# Patient Record
Sex: FEMALE | Race: WHITE | NOT HISPANIC OR LATINO | Employment: FULL TIME | URBAN - METROPOLITAN AREA
[De-identification: names, ages, dates, MRNs, and addresses within clinical notes are randomized per-mention and may not be internally consistent; named-entity substitution may affect disease eponyms.]

---

## 2017-04-05 ENCOUNTER — ALLSCRIPTS OFFICE VISIT (OUTPATIENT)
Dept: OTHER | Facility: OTHER | Age: 27
End: 2017-04-05

## 2017-04-05 DIAGNOSIS — R23.2 FLUSHING: ICD-10-CM

## 2017-11-02 NOTE — PROGRESS NOTES
Assessment  1  History of URI, acute (465 9) (J06 9)    Plan   Rapid StrepA- POC; Source:Throat; Status:Resulted - Requires Verification,Retrospective By Protocol Authorization;   Done: 87MFT7492 12:00AM  Due:32Kdb9544; Last Updated By:Nghia Villaseñor; 1/2/2017 8:44:33 AM;Ordered; [address]: Sore throat; Ordered By:Lalito Barahona;   (1) THROAT CULTURE (CULTURE, UPPER RESPIRATORY); Status:Resulted - Requires Verification;   Done: 17ISY1048 12:00AM  Due:63Lzh4343;Ordered; [address]: URI, acute; Ordered By:Tobias Barahona; Discussion/Summary  Discussion Summary:   Sudafe as directedas directed  Medication Side Effects Reviewed: Possible side effects of new medications were reviewed with the patient/guardian today  Understands and agrees with treatment plan: The treatment plan was reviewed with the patient/guardian  The patient/guardian understands and agrees with the treatment plan   Counseling Documentation With Imm: The patient was counseled regarding diagnostic results,-- instructions for management,-- risk factor reductions,-- prognosis,-- patient and family education,-- impressions,-- risks and benefits of treatment options,-- importance of compliance with treatment  Follow Up Instructions: Follow Up with your Primary Care Provider in 3-4 days  If your symptoms worsen, go to the nearest [place] [address] Emergency Department  Chief Complaint  Chief Complaint Free Text Note Form: seen here 2 days ago with sore throat, strep negative, still has sore throat now congested and ear pain, taking motrin      History of Present Illness  HPI: 27 y/o f c/o sore throat, nasal congestion, ear fullness/clogging x 2 days  Denies any fever/chills, nausea, vomiting, diarrhea, constipation, new rashes, sick contacts  Pt has not tried any OTC medications  Hospital Based Practices Required Assessment:   Pain Assessment   the patient states they have pain  The pain is located in the throat   (on a scale of 0 to 10, the patient rates the pain at 6 )   Abuse And Domestic Violence Screen    Yes, the patient is safe at home  -- The patient states no one is hurting them  Depression And Suicide Screen  No, the patient has not had thoughts of hurting themself  No, the patient has not felt depressed in the past 7 days  Prefered Language is  english  Primary Language is  engl;patience  Readiness To Learn: Receptive  Barriers To Learning: none  Review of Systems  Focused-Female:   Constitutional: No fever, no chills, feels well, no tiredness, no recent weight gain or loss  ENT: no ear ache, no loss of hearing, no nosebleeds or nasal discharge, no sore throat or hoarseness-- and-- as noted in HPI  Cardiovascular: no complaints of slow or fast heart rate, no chest pain, no palpitations, no leg claudication or lower extremity edema  Respiratory: no complaints of shortness of breath, no wheezing, no dyspnea on exertion, no orthopnea or PND  Breasts: no complaints of breast pain, breast lump or nipple discharge  Gastrointestinal: no complaints of abdominal pain, no constipation, no nausea or diarrhea, no vomiting, no bloody stools  Genitourinary: no complaints of dysuria, no incontinence, no pelvic pain, no dysmenorrhea, no vaginal discharge or abnormal vaginal bleeding  Musculoskeletal: no complaints of arthralgia, no myalgia, no joint swelling or stiffness, no limb pain or swelling  Integumentary: no complaints of skin rash or lesion, no itching or dry skin, no skin wounds  Neurological: no complaints of headache, no confusion, no numbness or tingling, no dizziness or fainting  Active Problems   1  Dysmenorrhea (625 3) (N94 6)   2  Fatigue (780 79) (R53 83)   3  Hidradenitis suppurativa (705 83) (L73 2)   4  Menorrhagia (626 2) (N92 0)   5  Obesity, morbid (more than 100 lbs over ideal weight or BMI > 40) (278 01) (E66 01)   6   Vitamin D deficiency (268 9) (E55 9)    Diarrhea (787 91) (R19 7) Sore throat (462) (J02 9)          Past Medical History  1  History of Acute URI (465 9) (J06 9)   2  History of Epidermal inclusion cyst (706 2) (L72 0)   3  History of contact dermatitis (V13 3) (Z87 2)   4  History of hearing loss (V12 49) (Z86 69)   5  History of migraine (V12 49) (Z86 69)   6  History of skin disorder (V13 3) (Z87 2)  Active Problems And Past Medical History Reviewed: The active problems and past medical history were reviewed and updated today  Family History  Mother    1  Family history of Depression   2  Family history of diabetes mellitus (V18 0) (Z83 3)  Father    3  Family history of Diabetes   4  Family history of diabetes mellitus (V18 0) (Z83 3)   5  Family history of Hiatal hernia   6  Family history of Hypertension  Paternal Grandmother    9  Family history of Bowel disease  Paternal Great Grandmother    6  Family history of Colon cancer  Family History Reviewed: The family history was reviewed and updated today  Social History   · Daily caffeine consumption, 2-3 servings a day   · Former smoker (V15 82) (X00 521)   · No alcohol use   · No drug use  Social History Reviewed: The social history was reviewed and updated today  Surgical History  1  History of Adenoidectomy   2  History of Oral Surgery Tooth Extraction  Surgical History Reviewed: The surgical history was reviewed and updated today  Current Meds   1  Lysteda 650 MG Oral Tablet; TAKE 2 TABLET 3 times daily when on menstral cycle; Therapy: 44VUX4762 to Recorded   2  Vitamin D (Ergocalciferol) 89866 UNIT Oral Capsule; TAKE 1 CAPSULE Weekly; Therapy: 58MZW6222 to (Last Rx:11Nov2016)  Requested for: 99EGQ7853 Ordered  Medication List Reviewed: The medication list was reviewed and updated today  Allergies  1  Biaxin TABS   2  Cipro TABS   3  Sulfa Drugs   4   Zithromax Z-Cordell TABS    Vitals  Signs   Recorded: 12KHD7213 05:41PM   Temperature: 99 F  Heart Rate: 100  Respiration: 20  Systolic: 849  Diastolic: 97  Weight: 622 lb   BMI Calculated: 41 51  BSA Calculated: 2 28  Pain Scale: 6    Physical Exam    Constitutional   General appearance: No acute distress, well appearing and well nourished  Eyes   Conjunctiva and lids: No swelling, erythema or discharge  Ears, Nose, Mouth, and Throat   External inspection of ears and nose: Normal     Otoscopic examination: Tympanic membranes translucent with normal light reflex  Canals patent without erythema  Nasal mucosa, septum, and turbinates: Abnormal   There was a mucoid discharge from both nares  The bilateral nasal mucosa was red  Oropharynx: Abnormal   The posterior pharynx had an exudate-- and-- +PND  Inspection of the oropharynx showed fully visible tonsils, uvula and soft palate (Mallampati class 1)  Pulmonary   Respiratory effort: No increased work of breathing or signs of respiratory distress  Auscultation of lungs: Clear to auscultation  Cardiovascular   Auscultation of heart: Normal rate and rhythm, normal S1 and S2, without murmurs  Lymphatic   Palpation of lymph nodes in neck: No lymphadenopathy  Skin   Skin and subcutaneous tissue: Normal without rashes or lesions      Psychiatric   Orientation to person, place, and time: Normal     Mood and affect: Normal        Signatures   Electronically signed by : Nataliia Bowden [facility]; Nov 1 2017 10:00AM EST                       (Author)    Electronically signed by : SHARON Pedraza ; Nov 1 2017  1:35PM EST                       (Co-author)

## 2017-12-27 ENCOUNTER — APPOINTMENT (EMERGENCY)
Dept: RADIOLOGY | Facility: HOSPITAL | Age: 27
End: 2017-12-27

## 2017-12-27 ENCOUNTER — HOSPITAL ENCOUNTER (EMERGENCY)
Facility: HOSPITAL | Age: 27
Discharge: HOME | End: 2017-12-27
Attending: EMERGENCY MEDICINE | Admitting: EMERGENCY MEDICINE

## 2017-12-27 VITALS
SYSTOLIC BLOOD PRESSURE: 126 MMHG | HEIGHT: 66 IN | TEMPERATURE: 97.6 F | BODY MASS INDEX: 39.37 KG/M2 | OXYGEN SATURATION: 100 % | DIASTOLIC BLOOD PRESSURE: 69 MMHG | RESPIRATION RATE: 16 BRPM | HEART RATE: 80 BPM | WEIGHT: 245 LBS

## 2017-12-27 DIAGNOSIS — R10.11 RUQ ABDOMINAL PAIN: Primary | ICD-10-CM

## 2017-12-27 LAB
ALBUMIN SERPL BCP-MCNC: 3.7 G/DL (ref 3.5–5)
ALP SERPL-CCNC: 82 U/L (ref 46–116)
ALT SERPL W P-5'-P-CCNC: 27 U/L (ref 12–78)
ANION GAP SERPL CALCULATED.3IONS-SCNC: 6 MMOL/L (ref 4–13)
AST SERPL W P-5'-P-CCNC: 14 U/L (ref 5–45)
BASOPHILS # BLD AUTO: 0.03 THOUSANDS/ΜL (ref 0–0.1)
BASOPHILS NFR BLD AUTO: 0 % (ref 0–1)
BILIRUB SERPL-MCNC: 0.45 MG/DL (ref 0.2–1)
BILIRUB UR QL STRIP: NEGATIVE
BUN SERPL-MCNC: 12 MG/DL (ref 5–25)
CALCIUM SERPL-MCNC: 9 MG/DL (ref 8.3–10.1)
CHLORIDE SERPL-SCNC: 106 MMOL/L (ref 100–108)
CLARITY UR: CLEAR
CO2 SERPL-SCNC: 26 MMOL/L (ref 21–32)
COLOR UR: YELLOW
COLOR, POC: NORMAL
CREAT SERPL-MCNC: 0.84 MG/DL (ref 0.6–1.3)
EOSINOPHIL # BLD AUTO: 0.16 THOUSAND/ΜL (ref 0–0.61)
EOSINOPHIL NFR BLD AUTO: 1 % (ref 0–6)
ERYTHROCYTE [DISTWIDTH] IN BLOOD BY AUTOMATED COUNT: 13.3 % (ref 11.6–15.1)
EXT PREG TEST URINE: NORMAL
GFR SERPL CREATININE-BSD FRML MDRD: 95 ML/MIN/1.73SQ M
GLUCOSE SERPL-MCNC: 110 MG/DL (ref 65–140)
GLUCOSE UR STRIP-MCNC: NEGATIVE MG/DL
HCT VFR BLD AUTO: 36.9 % (ref 34.8–46.1)
HGB BLD-MCNC: 12.4 G/DL (ref 11.5–15.4)
HGB UR QL STRIP.AUTO: NEGATIVE
KETONES UR STRIP-MCNC: NEGATIVE MG/DL
LEUKOCYTE ESTERASE UR QL STRIP: NEGATIVE
LIPASE SERPL-CCNC: 133 U/L (ref 73–393)
LYMPHOCYTES # BLD AUTO: 2.86 THOUSANDS/ΜL (ref 0.6–4.47)
LYMPHOCYTES NFR BLD AUTO: 21 % (ref 14–44)
MCH RBC QN AUTO: 27.3 PG (ref 26.8–34.3)
MCHC RBC AUTO-ENTMCNC: 33.6 G/DL (ref 31.4–37.4)
MCV RBC AUTO: 81 FL (ref 82–98)
MONOCYTES # BLD AUTO: 0.85 THOUSAND/ΜL (ref 0.17–1.22)
MONOCYTES NFR BLD AUTO: 6 % (ref 4–12)
NEUTROPHILS # BLD AUTO: 9.74 THOUSANDS/ΜL (ref 1.85–7.62)
NEUTS SEG NFR BLD AUTO: 72 % (ref 43–75)
NITRITE UR QL STRIP: NEGATIVE
NRBC BLD AUTO-RTO: 0 /100 WBCS
PH UR STRIP.AUTO: 5.5 [PH] (ref 4.5–8)
PLATELET # BLD AUTO: 245 THOUSANDS/UL (ref 149–390)
PMV BLD AUTO: 10.1 FL (ref 8.9–12.7)
POTASSIUM SERPL-SCNC: 3.6 MMOL/L (ref 3.5–5.3)
PROT SERPL-MCNC: 8.2 G/DL (ref 6.4–8.2)
PROT UR STRIP-MCNC: NEGATIVE MG/DL
RBC # BLD AUTO: 4.55 MILLION/UL (ref 3.81–5.12)
SODIUM SERPL-SCNC: 138 MMOL/L (ref 136–145)
SP GR UR STRIP.AUTO: 1.02 (ref 1–1.03)
UROBILINOGEN UR QL STRIP.AUTO: 0.2 E.U./DL
WBC # BLD AUTO: 13.69 THOUSAND/UL (ref 4.31–10.16)

## 2017-12-27 PROCEDURE — 96361 HYDRATE IV INFUSION ADD-ON: CPT

## 2017-12-27 PROCEDURE — 85025 COMPLETE CBC W/AUTO DIFF WBC: CPT | Performed by: EMERGENCY MEDICINE

## 2017-12-27 PROCEDURE — 96374 THER/PROPH/DIAG INJ IV PUSH: CPT

## 2017-12-27 PROCEDURE — 83690 ASSAY OF LIPASE: CPT | Performed by: EMERGENCY MEDICINE

## 2017-12-27 PROCEDURE — 81003 URINALYSIS AUTO W/O SCOPE: CPT

## 2017-12-27 PROCEDURE — 80053 COMPREHEN METABOLIC PANEL: CPT | Performed by: EMERGENCY MEDICINE

## 2017-12-27 PROCEDURE — 96375 TX/PRO/DX INJ NEW DRUG ADDON: CPT

## 2017-12-27 PROCEDURE — 36415 COLL VENOUS BLD VENIPUNCTURE: CPT | Performed by: EMERGENCY MEDICINE

## 2017-12-27 PROCEDURE — 81025 URINE PREGNANCY TEST: CPT | Performed by: EMERGENCY MEDICINE

## 2017-12-27 PROCEDURE — 81002 URINALYSIS NONAUTO W/O SCOPE: CPT | Performed by: EMERGENCY MEDICINE

## 2017-12-27 PROCEDURE — 99284 EMERGENCY DEPT VISIT MOD MDM: CPT

## 2017-12-27 PROCEDURE — 76705 ECHO EXAM OF ABDOMEN: CPT

## 2017-12-27 RX ORDER — KETOROLAC TROMETHAMINE 30 MG/ML
15 INJECTION, SOLUTION INTRAMUSCULAR; INTRAVENOUS ONCE
Status: COMPLETED | OUTPATIENT
Start: 2017-12-27 | End: 2017-12-27

## 2017-12-27 RX ORDER — ONDANSETRON 2 MG/ML
4 INJECTION INTRAMUSCULAR; INTRAVENOUS ONCE
Status: COMPLETED | OUTPATIENT
Start: 2017-12-27 | End: 2017-12-27

## 2017-12-27 RX ORDER — DICYCLOMINE HCL 20 MG
20 TABLET ORAL 2 TIMES DAILY
Qty: 20 TABLET | Refills: 0 | Status: SHIPPED | OUTPATIENT
Start: 2017-12-27 | End: 2018-06-21 | Stop reason: ALTCHOICE

## 2017-12-27 RX ORDER — OMEPRAZOLE 20 MG/1
20 CAPSULE, DELAYED RELEASE ORAL DAILY
Qty: 20 CAPSULE | Refills: 0 | Status: SHIPPED | OUTPATIENT
Start: 2017-12-27 | End: 2018-06-21 | Stop reason: ALTCHOICE

## 2017-12-27 RX ADMIN — HYDROMORPHONE HYDROCHLORIDE 0.5 MG: 1 INJECTION, SOLUTION INTRAMUSCULAR; INTRAVENOUS; SUBCUTANEOUS at 06:39

## 2017-12-27 RX ADMIN — KETOROLAC TROMETHAMINE 15 MG: 30 INJECTION, SOLUTION INTRAMUSCULAR at 05:23

## 2017-12-27 RX ADMIN — SODIUM CHLORIDE 1000 ML: 0.9 INJECTION, SOLUTION INTRAVENOUS at 05:19

## 2017-12-27 RX ADMIN — ONDANSETRON 4 MG: 2 INJECTION INTRAMUSCULAR; INTRAVENOUS at 05:23

## 2017-12-27 NOTE — ED ATTENDING ATTESTATION
Alka Diallo MD, saw and evaluated the patient  I have discussed the patient with the resident/non-physician practitioner and agree with the resident's/non-physician practitioner's findings, Plan of Care, and MDM as documented in the resident's/non-physician practitioner's note, except where noted  All available labs and Radiology studies were reviewed  At this point I agree with the current assessment done in the Emergency Department  I have conducted an independent evaluation of this patient including a focused history of:    Emergency Department Note- Isauro Yoder 28 y o  female MRN: [id number]    Unit/Bed#: [facility]B Encounter: [id number]    Isauro Yoder is a 28 y o  female who presents with   Chief Complaint   Patient presents with    Abdominal Pain     Pt  reports RUQ abdominal pain since Sunday  Denies n/v/d/consitpation  History of Present Illness   HPI:  Isauro Yoder is a 28 y o  female who presents for evaluation of:  Acute right upper quadrant abdominal pain since Sunday  The abdominal pain worsened this morning prompting a visit to the ED  The patient denies associated vomiting and diarrhea  She has been nauseous  She denies fevers and chills  She denies hematuria, dysuria, vaginal discharge, and flank pain  Patient's last menstrual period was 12/04/2017 (approximate)  Review of Systems   Constitutional: Positive for appetite change  Negative for fever  HENT: Negative for congestion and sore throat  Cardiovascular: Negative for chest pain and palpitations  Genitourinary: Negative for flank pain and hematuria  All other systems reviewed and are negative  Historical Information   History reviewed  No pertinent past medical history  History reviewed  No pertinent surgical history    Social History   History   Alcohol Use No     History   Drug Use No     History   Smoking Status    Never Smoker   Smokeless Tobacco    Never Used     Family History: non-contributory    Meds/Allergies   all medications and allergies reviewed  Allergies   Allergen Reactions    Biaxin [Clarithromycin] Vomiting    Ciprofloxacin Hives    Sulfa Antibiotics Hives    Zithromax [Azithromycin] Vomiting       Objective   First Vitals:   Blood Pressure: 153/91 (12/27/17 0438)  Pulse: 105 (12/27/17 0438)  Temperature: 97 6 °F (36 4 °C) (12/27/17 0438)  Temp Source: Oral (12/27/17 0438)  Respirations: 18 (12/27/17 0438)  Height: 5' 6" (167 6 cm) (12/27/17 0438)  Weight - Scale: 111 kg (245 lb) (12/27/17 0438)  SpO2: 99 % (12/27/17 0438)    Current Vitals:   Blood Pressure: 126/69 (12/27/17 0813)  Pulse: 80 (12/27/17 0813)  Temperature: 97 6 °F (36 4 °C) (12/27/17 0438)  Temp Source: Oral (12/27/17 0438)  Respirations: 16 (12/27/17 0813)  Height: 5' 6" (167 6 cm) (12/27/17 0438)  Weight - Scale: 111 kg (245 lb) (12/27/17 0438)  SpO2: 100 % (12/27/17 0813)      Intake/Output Summary (Last 24 hours) at 12/27/17 2009  Last data filed at 12/27/17 0701   Gross per 24 hour   Intake             1000 ml   Output                0 ml   Net             1000 ml       Invasive Devices          No matching active lines, drains, or airways          Physical Exam   Constitutional: She is oriented to person, place, and time  She appears well-developed and well-nourished  HENT:   Head: Normocephalic and atraumatic  Eyes: Conjunctivae are normal  Pupils are equal, round, and reactive to light  Cardiovascular: Normal rate and regular rhythm  Pulmonary/Chest: Effort normal and breath sounds normal    Abdominal: Soft  Bowel sounds are normal  There is tenderness (Right upper quadrant)  Musculoskeletal: Normal range of motion  She exhibits no deformity  Neurological: She is alert and oriented to person, place, and time  Skin: Skin is warm and dry  Psychiatric: She has a normal mood and affect   Her behavior is normal  Judgment and thought content normal    Nursing note and vitals reviewed  Medical Decision Makin  Acute right upper quadrant abdominal pain:  Plan to obtain a CBC to rule out leukocytosis; plan to obtain a right upper quadrant ultrasound to rule out cholecystitis; plan to obtain a complete metabolic profile to rule out cholestatic picture and hepatitis      Recent Results (from the past 36 hour(s))   CBC and differential    Collection Time: 17  5:19 AM   Result Value Ref Range    WBC 13 69 (H) 4 31 - 10 16 Thousand/uL    RBC 4 55 3 81 - 5 12 Million/uL    Hemoglobin 12 4 11 5 - 15 4 g/dL    Hematocrit 36 9 34 8 - 46 1 %    MCV 81 (L) 82 - 98 fL    MCH 27 3 26 8 - 34 3 pg    MCHC 33 6 31 4 - 37 4 g/dL    RDW 13 3 11 6 - 15 1 %    MPV 10 1 8 9 - 12 7 fL    Platelets 607 372 - 396 Thousands/uL    nRBC 0 /100 WBCs    Neutrophils Relative 72 43 - 75 %    Lymphocytes Relative 21 14 - 44 %    Monocytes Relative 6 4 - 12 %    Eosinophils Relative 1 0 - 6 %    Basophils Relative 0 0 - 1 %    Neutrophils Absolute 9 74 (H) 1 85 - 7 62 Thousands/µL    Lymphocytes Absolute 2 86 0 60 - 4 47 Thousands/µL    Monocytes Absolute 0 85 0 17 - 1 22 Thousand/µL    Eosinophils Absolute 0 16 0 00 - 0 61 Thousand/µL    Basophils Absolute 0 03 0 00 - 0 10 Thousands/µL   Comprehensive metabolic panel    Collection Time: 17  5:19 AM   Result Value Ref Range    Sodium 138 136 - 145 mmol/L    Potassium 3 6 3 5 - 5 3 mmol/L    Chloride 106 100 - 108 mmol/L    CO2 26 21 - 32 mmol/L    Anion Gap 6 4 - 13 mmol/L    BUN 12 5 - 25 mg/dL    Creatinine 0 84 0 60 - 1 30 mg/dL    Glucose 110 65 - 140 mg/dL    Calcium 9 0 8 3 - 10 1 mg/dL    AST 14 5 - 45 U/L    ALT 27 12 - 78 U/L    Alkaline Phosphatase 82 46 - 116 U/L    Total Protein 8 2 6 4 - 8 2 g/dL    Albumin 3 7 3 5 - 5 0 g/dL    Total Bilirubin 0 45 0 20 - 1 00 mg/dL    eGFR 95 ml/min/1 73sq m   Lipase    Collection Time: 17  5:19 AM   Result Value Ref Range    Lipase 133 73 - 393 u/L   POCT urinalysis dipstick    Collection Time: 12/27/17  5:28 AM   Result Value Ref Range    Color, UA complete    POCT pregnancy, urine    Collection Time: 12/27/17  5:28 AM   Result Value Ref Range    EXT PREG TEST UR (Ref: Negative) neg    ED Urine Macroscopic    Collection Time: 12/27/17  5:28 AM   Result Value Ref Range    Color, UA Yellow     Clarity, UA Clear     pH, UA 5 5 4 5 - 8 0    Leukocytes, UA Negative Negative    Nitrite, UA Negative Negative    Protein, UA Negative Negative mg/dl    Glucose, UA Negative Negative mg/dl    Ketones, UA Negative Negative mg/dl    Urobilinogen, UA 0 2 0 2, 1 0 E U /dl E U /dl    Bilirubin, UA Negative Negative    Blood, UA Negative Negative    Specific Gravity, UA 1 020 1 003 - 1 030     US gallbladder   Final Result      No acute findings         Workstation performed: [id number]               Portions of the record may have been created with voice recognition software  Occasional wrong word or "sound a like" substitutions may have occurred due to the inherent limitations of voice recognition software  Read the chart carefully and recognize, using context, where substitutions have occurred

## 2017-12-27 NOTE — DISCHARGE INSTRUCTIONS
Abdominal Pain   WHAT YOU NEED TO KNOW:   Abdominal pain can be dull, achy, or sharp  You may have pain in one area of your abdomen, or in your entire abdomen  Your pain may be caused by a condition such as constipation, food sensitivity or poisoning, infection, or a blockage  Abdominal pain can also be from a hernia, appendicitis, or an ulcer  Liver, gallbladder, or kidney conditions can also cause abdominal pain  The cause of your abdominal pain may be unknown  DISCHARGE INSTRUCTIONS:   Return to the emergency department if:   · You have new chest pain or shortness of breath  · You have pulsing pain in your upper abdomen or lower back that suddenly becomes constant  · Your pain is in the right lower abdominal area and worsens with movement  · You have a fever over 100 4°F (38°C) or shaking chills  · You are vomiting and cannot keep food or liquids down  · Your pain does not improve or gets worse over the next 8 to 12 hours  · You see blood in your vomit or bowel movements, or they look black and tarry  · Your skin or the whites of your eyes turn yellow  · You are a woman and have a large amount of vaginal bleeding that is not your monthly period  Contact your healthcare provider if:   · You have pain in your lower back  · You are a man and have pain in your testicles  · You have pain when you urinate  · You have questions or concerns about your condition or care  Follow up with your healthcare provider within 24 hours or as directed:  Write down your questions so you remember to ask them during your visits  Medicines:   · Medicines  may be given to calm your stomach and prevent vomiting or to decrease pain  Ask how to take pain medicine safely  · Take your medicine as directed  Contact your healthcare provider if you think your medicine is not helping or if you have side effects  Tell him of her if you are allergic to any medicine   Keep a list of the medicines, vitamins, and herbs you take  Include the amounts, and when and why you take them  Bring the list or the pill bottles to follow-up visits  Carry your medicine list with you in case of an emergency  © 2017 [address] [employer]  Information is for End User's use only and may not be sold, redistributed or otherwise used for commercial purposes  All illustrations and images included in CareNotes® are the copyrighted property of [employer]  or [employer]  The above information is an  only  It is not intended as medical advice for individual conditions or treatments  Talk to your doctor, nurse or pharmacist before following any medical regimen to see if it is safe and effective for you  Abdominal Pain   WHAT YOU NEED TO KNOW:   Abdominal pain can be dull, achy, or sharp  You may have pain in one area of your abdomen, or in your entire abdomen  Your pain may be caused by a condition such as constipation, food sensitivity or poisoning, infection, or a blockage  Abdominal pain can also be from a hernia, appendicitis, or an ulcer  Liver, gallbladder, or kidney conditions can also cause abdominal pain  The cause of your abdominal pain may be unknown  DISCHARGE INSTRUCTIONS:   Return to the emergency department if:   · You have new chest pain or shortness of breath  · You have pulsing pain in your upper abdomen or lower back that suddenly becomes constant  · Your pain is in the right lower abdominal area and worsens with movement  · You have a fever over 100 4°F (38°C) or shaking chills  · You are vomiting and cannot keep food or liquids down  · Your pain does not improve or gets worse over the next 8 to 12 hours  · You see blood in your vomit or bowel movements, or they look black and tarry  · Your skin or the whites of your eyes turn yellow  · You are a woman and have a large amount of vaginal bleeding that is not your monthly period    Contact your healthcare provider if:   · You have pain in your lower back  · You are a man and have pain in your testicles  · You have pain when you urinate  · You have questions or concerns about your condition or care  Follow up with your healthcare provider within 24 hours or as directed:  Write down your questions so you remember to ask them during your visits  Medicines:   · Medicines  may be given to calm your stomach and prevent vomiting or to decrease pain  Ask how to take pain medicine safely  · Take your medicine as directed  Contact your healthcare provider if you think your medicine is not helping or if you have side effects  Tell him of her if you are allergic to any medicine  Keep a list of the medicines, vitamins, and herbs you take  Include the amounts, and when and why you take them  Bring the list or the pill bottles to follow-up visits  Carry your medicine list with you in case of an emergency  © 2017 [address] [employer] Information is for End User's use only and may not be sold, redistributed or otherwise used for commercial purposes  All illustrations and images included in CareNotes® are the copyrighted property of [employer]  or [employer]  The above information is an  only  It is not intended as medical advice for individual conditions or treatments  Talk to your doctor, nurse or pharmacist before following any medical regimen to see if it is safe and effective for you

## 2017-12-27 NOTE — ED PROVIDER NOTES
History  Chief Complaint   Patient presents with    Abdominal Pain     Pt  reports RUQ abdominal pain since Sunday  Denies n/v/d/consitpation  This is a 28 y o  old female who presents to the ED for evaluation of abdominal pain  Patient reports right upper quadrant abdominal pain that started on Sunday  It was constant until Monday evening  Spontaneously remitted  Around 2100 hours last evening it came back  It is sharp, radiates toward her back, rated 8/10  Worse with inspiration  States she can't lay still  Associated with nausea  No vomiting, diarrhea, constipation  She denies fever, chills, night sweats, cough, just, runny nose, chest pain, shortness of breath, urinary symptoms  Last menstrual period was 12/4  There is a family history of gallbladder disease  None     History reviewed  No pertinent past medical history  History reviewed  No pertinent surgical history  History reviewed  No pertinent family history  I have reviewed and agree with the history as documented  Social History   Substance Use Topics    Smoking status: Never Smoker    Smokeless tobacco: Never Used    Alcohol use No      Review of Systems   Constitutional: Negative for chills, fatigue, fever and unexpected weight change  HENT: Negative for congestion, rhinorrhea and sore throat  Eyes: Negative for redness and visual disturbance  Respiratory: Negative for cough and shortness of breath  Cardiovascular: Negative for chest pain and leg swelling  Gastrointestinal: Positive for abdominal pain  Negative for constipation, diarrhea, nausea and vomiting  Endocrine: Negative for cold intolerance and heat intolerance  Genitourinary: Negative for dysuria, frequency and urgency  Musculoskeletal: Negative for back pain  Skin: Negative for rash  Neurological: Negative for dizziness, syncope and numbness  All other systems reviewed and are negative      Physical Exam  ED Triage Vitals [12/27/17 0438]   Temperature Pulse Respirations Blood Pressure SpO2   97 6 °F (36 4 °C) 105 18 153/91 99 %      Temp Source Heart Rate Source Patient Position - Orthostatic VS BP Location FiO2 (%)   Oral Monitor Standing Left arm --      Pain Score       8         Physical Exam   Constitutional: She is oriented to person, place, and time  She appears well-developed and well-nourished  She appears distressed (Mild painful)  HENT:   Head: Normocephalic and atraumatic  Nose: Nose normal    Mouth/Throat: No oropharyngeal exudate  Eyes: Conjunctivae and EOM are normal  Pupils are equal, round, and reactive to light  Neck: Normal range of motion  Neck supple  Cardiovascular: Normal rate, regular rhythm and normal heart sounds  Exam reveals no gallop  No murmur heard  Pulmonary/Chest: Effort normal and breath sounds normal  She has no wheezes  She exhibits no tenderness  Abdominal: Soft  Bowel sounds are normal  She exhibits no distension  There is no tenderness  There is no rebound and no guarding  Musculoskeletal: Normal range of motion  She exhibits no tenderness or deformity  Lymphadenopathy:     She has no cervical adenopathy  Neurological: She is alert and oriented to person, place, and time  No cranial nerve deficit  Skin: Skin is warm and dry  No rash noted  She is not diaphoretic  No erythema  Psychiatric: She has a normal mood and affect  Nursing note and vitals reviewed      ED Medications  Medications   ketorolac (TORADOL) injection 15 mg (15 mg Intravenous Given 12/27/17 0523)   ondansetron (ZOFRAN) injection 4 mg (4 mg Intravenous Given 12/27/17 0523)   sodium chloride 0 9 % bolus 1,000 mL (0 mL Intravenous Stopped 12/27/17 0701)   HYDROmorphone (DILAUDID) 1 mg/mL injection 0 5 mg (0 5 mg Intravenous Given 12/27/17 0639)     Diagnostic Studies  Results Reviewed     Procedure Component Value Units Date/Time    Comprehensive metabolic panel [[id number]] Collected:  12/27/17 2043 Lab Status:  Final result Specimen:  Blood from Arm, Right Updated:  12/27/17 0545     Sodium 138 mmol/L      Potassium 3 6 mmol/L      Chloride 106 mmol/L      CO2 26 mmol/L      Anion Gap 6 mmol/L      BUN 12 mg/dL      Creatinine 0 84 mg/dL      Glucose 110 mg/dL      Calcium 9 0 mg/dL      AST 14 U/L      ALT 27 U/L      Alkaline Phosphatase 82 U/L      Total Protein 8 2 g/dL      Albumin 3 7 g/dL      Total Bilirubin 0 45 mg/dL      eGFR 95 ml/min/1 73sq m     Narrative:         National Kidney Disease Education Program recommendations are as follows:  GFR calculation is accurate only with a steady state creatinine  Chronic Kidney disease less than 60 ml/min/1 73 sq  meters  Kidney failure less than 15 ml/min/1 73 sq  meters      Lipase [[id number]]  (Normal) Collected:  12/27/17 0519    Lab Status:  Final result Specimen:  Blood from Arm, Right Updated:  12/27/17 0545     Lipase 133 u/L     CBC and differential [[id number]]  (Abnormal) Collected:  12/27/17 0519    Lab Status:  Final result Specimen:  Blood from Arm, Right Updated:  12/27/17 0531     WBC 13 69 (H) Thousand/uL      RBC 4 55 Million/uL      Hemoglobin 12 4 g/dL      Hematocrit 36 9 %      MCV 81 (L) fL      MCH 27 3 pg      MCHC 33 6 g/dL      RDW 13 3 %      MPV 10 1 fL      Platelets 481 Thousands/uL      nRBC 0 /100 WBCs      Neutrophils Relative 72 %      Lymphocytes Relative 21 %      Monocytes Relative 6 %      Eosinophils Relative 1 %      Basophils Relative 0 %      Neutrophils Absolute 9 74 (H) Thousands/µL      Lymphocytes Absolute 2 86 Thousands/µL      Monocytes Absolute 0 85 Thousand/µL      Eosinophils Absolute 0 16 Thousand/µL      Basophils Absolute 0 03 Thousands/µL     POCT urinalysis dipstick [[id number]]  (Normal) Resulted:  12/27/17 0528    Lab Status:  Final result Specimen:  Urine Updated:  12/27/17 0528     Color, UA complete    POCT pregnancy, urine [[id number]]  (Normal) Resulted:  12/27/17 0528    Lab Status:  Final result Updated:  12/27/17 0528     EXT PREG TEST UR (Ref: Negative) neg    ED Urine Macroscopic [[id number]]  (Normal) Collected:  12/27/17 0528    Lab Status:  Final result Specimen:  Urine Updated:  12/27/17 0527     Color, UA Yellow     Clarity, UA Clear     pH, UA 5 5     Leukocytes, UA Negative     Nitrite, UA Negative     Protein, UA Negative mg/dl      Glucose, UA Negative mg/dl      Ketones, UA Negative mg/dl      Urobilinogen, UA 0 2 E U /dl      Bilirubin, UA Negative     Blood, UA Negative     Specific Gravity, UA 1 020    Narrative:       CLINITEK RESULT        US gallbladder   Final Result by Robyn Marcelo MD (12/27 1026)      No acute findings         Workstation performed: [id number]           Procedures  Procedures    Phone Consults  ED Phone Contact    ED Course    A/P: This is a 28 y o  female who presents to the ED for evaluation of abdominal pain  Concern for cholecystitis, pancreatitis, hepatitis  We will check abdominal labs, UA, urine pregnancy  Symptom management  Right upper quadrant ultrasound  Dispo accordingly  MDM  CritCare Time    Disposition  Final diagnoses:   RUQ abdominal pain     Time reflects when diagnosis was documented in both MDM as applicable and the Disposition within this note     Time User Action Codes Description Comment    12/27/2017  8:04 AM Berna Jeong Add [R10 11] RUQ abdominal pain       ED Disposition     ED Disposition Condition Comment    Discharge  Lauren Hallman discharge to home/self care      Condition at discharge: Good        Follow-up Information     Follow up With Specialties Details Why [address] [place] [address],  Gastroenterology   [address] [place] [address]  [phone]          Discharge Medication List as of 12/27/2017  8:05 AM      START taking these medications    Details   dicyclomine (BENTYL) 20 mg tablet Take 1 tablet by mouth 2 (two) times a day, Starting Wed 12/27/2017, Print      omeprazole (PriLOSEC) 20 mg delayed release capsule Take 1 capsule by mouth daily, Starting Wed 12/27/2017, Print           No discharge procedures on file  ED Provider  Attending physically available and evaluated Sheela José GLOVER managed the patient along with the ED Attending      Electronically Signed by         Piter Marshall MD  Resident  12/28/17 7427

## 2018-01-13 VITALS
SYSTOLIC BLOOD PRESSURE: 136 MMHG | HEIGHT: 67 IN | TEMPERATURE: 97.5 F | HEART RATE: 84 BPM | DIASTOLIC BLOOD PRESSURE: 84 MMHG | WEIGHT: 264.25 LBS | BODY MASS INDEX: 41.47 KG/M2

## 2018-01-15 NOTE — PROGRESS NOTES
Assessment    1  Encounter for preventive health examination (V70 0) (Z00 00)   2  Fatigue (780 79) (R53 83)   3  Obesity, morbid (more than 100 lbs over ideal weight or BMI > 40) (278 01) (E66 01)   4  Diarrhea (787 91) (R19 7)    Plan  Diarrhea    · (1) CELIAC DISEASE AB PROFILE; Status:Active; Requested for:03Nov2016;    · (1) STOOL CULTURE; Source:Rectum; Status:Active; Requested for:03Nov2016;    · (1) VITAMIN D 25-HYDROXY; Status:Active; Requested for:03Nov2016;   Dysmenorrhea, Health Maintenance    · (1) CBC/PLT/DIFF; Status:Active; Requested for:03Nov2016;   Dysmenorrhea, Menorrhagia, Obesity, morbid (more than 100 lbs over ideal weight or  BMI > 40)    · (1) TSH WITH FT4 REFLEX; Status:Active; Requested NOU:[id number]; Health Maintenance    · (1) COMPREHENSIVE METABOLIC PANEL; Status:Active; Requested for:03Nov2016;   Obesity, morbid (more than 100 lbs over ideal weight or BMI > 40)    · (1) LIPID PANEL, FASTING; Status:Active; Requested for:03Nov2016;     Discussion/Summary  health maintenance visit Currently, she eats an adequate diet and has an inadequate exercise regimen  the risks and benefits of cervical cancer screening were discussed cervical cancer screening is managed by OB/GYN Testing was done today for Declines  Breast cancer screening: breast cancer screening is not indicated  Colorectal cancer screening: colorectal cancer screening is not indicated  Osteoporosis screening: bone mineral density testing is not indicated  Screening lab work includes hemoglobin, glucose, lipid profile, thyroid function testing and 25-hydroxyvitamin D  The Patient will obtain her records of immunizations  Advice and education were given regarding nutrition, weight bearing exercise, weight loss, reproductive health and sunscreen use  Patient discussion: discussed with the patient       As discussed, follow up next week to review results of your labs    Possible side effects of new medications were reviewed with the patient/guardian today  The patient was counseled regarding diagnostic results, instructions for management, risk factor reductions, prognosis, patient and family education, impressions, risks and benefits of treatment options, importance of compliance with treatment  Chief Complaint  Patient here for yearly physical  She reports that she has excessive tiredness      History of Present Illness  HM, Adult Female: The patient is being seen for a health maintenance evaluation  The last health maintenance visit was 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, 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  Social History: Household members include domestic partner of the same sex and mother  She is unmarried  Work status: working full time and occupation:   The patient is a former cigarette smoker, quit smoking 2015 and has never used smokeless tobacco  She has smoked for 15 year(s) and has 2 pack year(s) of cigarette use  She reports rare alcohol use and drinking 5 drinks per month  The patient has no concerns about alcohol abuse  She has never used illicit drugs  General Health: The patient's health since the last visit is described as good  She has regular dental visits  She denies vision problems  She denies hearing loss  Immunizations status: up to date  Lifestyle:  She consumes a diverse and healthy diet  She has weight concerns  She does not exercise regularly  She does not use tobacco  She consumes alcohol  She denies drug use  Reproductive health:  she reports abnormal menses  (Patient reports heavy menstrual bleeding but is on OCP, which helps, she reports  )   she uses no contraception  she is sexually active  she denies prior pregnancies  Screening: cancer screening reviewed and updated  Cervical cancer screening includes a pap smear performed last year, uncertain timing of her last human papilloma virus screening and no previous colposcopy   Breast cancer screening includes no previous mammogram, a clinical breast exam performed last year and monthly breast self-exams performed  She hasn't been previously screened for colorectal cancer  metabolic screening reviewed and updated  Metabolic screening includes no previous lipid profile, no previous glucose screening, no previous thyroid function test and no previous DEXA  risk screening reviewed and updated  Safety elements used: seat belt, safe driving habits and smoke detector, but no motorcycle helmet and no sunscreen  HPI: Patient presents for a physical exam  She reports that she has been very tired lately and has been gaining weight  She does not know when her last physical exam was  She otherwise reports diarrhea after she eats meals and is not sure if she has an allergy or food intolerances  Review of Systems    Constitutional: recent 13 lb weight gain and feeling tired, but no fever, not feeling poorly and no chills  Eyes: No complaints of eye pain, no red eyes, no eyesight problems, no discharge, no dry eyes, no itching of eyes  ENT: no complaints of earache, no loss of hearing, no nose bleeds, no nasal discharge, no sore throat, no hoarseness  Cardiovascular: No complaints of slow heart rate, no fast heart rate, no chest pain, no palpitations, no leg claudication, no lower extremity edema  Respiratory: shortness of breath during exertion, but no shortness of breath, no cough, no orthopnea and no wheezing  Gastrointestinal: no abdominal pain, no nausea, no vomiting, no constipation and no blood in stools    The patient presents with complaints of diarrhea (Has frequent diarrhea  Reports that she gets abdominal cramping and then has to have a BM whenever she eats meals  )  Genitourinary: no dysuria, no pelvic pain, no vaginal discharge, no incontinence and no unexplained vaginal bleeding  Musculoskeletal: No complaints of arthralgias, no myalgias, no joint swelling or stiffness, no limb pain or swelling  Integumentary: No complaints of skin rash or lesions, no itching, no skin wounds, no breast pain or lump  Neurological: No complaints of headache, no confusion, no convulsions, no numbness, no dizziness or fainting, no tingling, no limb weakness, no difficulty walking  Psychiatric: Not suicidal, no sleep disturbance, no anxiety or depression, no change in personality, no emotional problems  Endocrine: no proptosis, no muscle weakness, no hot flashes and no deepening of the voice  no feelings of weakness   Hematologic/Lymphatic: No complaints of swollen glands, no swollen glands in the neck, does not bleed easily, does not bruise easily  Over the past 2 weeks, how often have you been bothered by the following problems? 1 ) Little interest or pleasure in doing things? Not at all    2 ) Feeling down, depressed or hopeless? Not at all    3 ) Trouble falling asleep or sleeping too much? Not at all    4 ) Feeling tired or having little energy? Nearly every day  5 ) Poor appetite or overeating? Several days  6 ) Feeling bad about yourself, or that you are a failure, or have let yourself or your family down? Not at all    7 ) Trouble concentrating on things, such as reading a newspaper or watching television? Nearly every day  8 ) Moving or speaking so slowly that other people could have noticed, or the opposite, moving or speaking faster than usual? Not at all  severity of depression is mild   How difficult have these problems made it for you to do your work, take care of things at home, or get along with people? Not at all  Score 7     ROS reviewed  Active Problems    1  Dysmenorrhea (625 3) (N94 6)   2  Hidradenitis suppurativa (705 83) (L73 2)   3   Menorrhagia (626 2) (N92 0)    Past Medical History    · History of Acute URI (465 9) (J06 9)   · History of Epidermal inclusion cyst (706 2) (L72 0)   · History of contact dermatitis (V13 3) (Z87 2)   · History of hearing loss (V12 49) (Z86 69)   · History of migraine (V12 49) (Z86 69)   · History of skin disorder (V13 3) (Z87 2)    Surgical History    · History of Adenoidectomy   · History of Oral Surgery Tooth Extraction    Family History  Mother    · Family history of Depression   · Family history of diabetes mellitus (V18 0) (Z83 3)  Father    · Family history of Diabetes   · Family history of diabetes mellitus (V18 0) (Z83 3)   · Family history of Hiatal hernia   · Family history of Hypertension  Paternal Grandmother    · Family history of Bowel disease  Paternal Great Grandmother    · Family history of Colon cancer    Social History    · Daily caffeine consumption, 2-3 servings a day   · Former smoker (V15 82) (F98 219)   · No alcohol use   · No drug use    Current Meds   1  Lysteda 650 MG Oral Tablet; TAKE 2 TABLET 3 times daily when on menstral cycle; Therapy: [id number] to Recorded   2  Triamcinolone Acetonide 0 1 % External Cream; APPLY  AND RUB  IN A THIN FILM TO   AFFECTED AREAS TWICE DAILY  (AM AND PM); Therapy: [id number] to (Last Rx:[id number])  Requested for: 19Kby6954 Ordered    Allergies    1  Biaxin TABS   2  Cipro TABS   3  Sulfa Drugs   4  Zithromax Z-Cordell TABS    Vitals   Recorded: 07NAN1931 99:52UW   Systolic 482, LUE, Sitting   Diastolic 80, LUE, Sitting   Heart Rate 95   Temperature 98 1 F, Tympanic   O2 Saturation 99, RA   Height 5 ft 7 in   Weight 263 lb 2 oz   BMI Calculated 41 21   BSA Calculated 2 27     Physical Exam    Constitutional   General appearance: Abnormal   obese  Head and Face   Head and face: Normal     Palpation of the face and sinuses: No sinus tenderness  Eyes   Conjunctiva and lids: No swelling, erythema or discharge  Pupils and irises: Equal, round, reactive to light  Ears, Nose, Mouth, and Throat   External inspection of ears and nose: Normal     Otoscopic examination: Tympanic membranes translucent with normal light reflex  Canals patent without erythema      Hearing: Normal     Nasal mucosa, septum, and turbinates: Normal without edema or erythema  Lips, teeth, and gums: Normal, good dentition  Oropharynx: Normal with no erythema, edema, exudate or lesions  Neck   Neck: Supple, symmetric, trachea midline, no masses  Thyroid: Normal, no thyromegaly  Pulmonary   Respiratory effort: No increased work of breathing or signs of respiratory distress  Auscultation of lungs: Clear to auscultation  Cardiovascular   Auscultation of heart: Normal rate and rhythm, normal S1 and S2, no murmurs  Carotid pulses: 2+ bilaterally  Pedal pulses: 2+ bilaterally  Examination of extremities for edema and/or varicosities: Normal     Abdomen   Abdomen: Non-tender, no masses  Liver and spleen: No hepatomegaly or splenomegaly  Lymphatic   Palpation of lymph nodes in neck: No lymphadenopathy  Musculoskeletal   Gait and station: Normal     Stability: Normal     Skin   Skin and subcutaneous tissue: Normal without rashes or lesions  Psychiatric   Judgment and insight: Normal     Orientation to person, place, and time: Normal     Recent and remote memory: Intact      Mood and affect: Normal        Future Appointments    Date/Time Provider Specialty Site   11/09/2016 02:00 PM Shannon Alarcon, [facility] Internal Medicine [facility]     Signatures   Electronically signed by : Zygmunt Shone; Nov 4 2016  8:43AM EST                       (Author)    Electronically signed by : Pablito Guerrero MD; Nov 4 2016  4:52PM EST                       (Author)

## 2018-01-18 NOTE — MISCELLANEOUS
Message  Return to work or school:   Jens Sanchez is under my professional care  She was seen in my office on 12/28/16        excuse due to illness        Signatures   Electronically signed by : Ana Maria Russo, [facility]; Dec 28 2016 12:59PM EST                       (Author)

## 2018-06-21 ENCOUNTER — OFFICE VISIT (OUTPATIENT)
Dept: INTERNAL MEDICINE CLINIC | Age: 28
End: 2018-06-21
Payer: COMMERCIAL

## 2018-06-21 VITALS
HEIGHT: 66 IN | SYSTOLIC BLOOD PRESSURE: 117 MMHG | WEIGHT: 242.2 LBS | TEMPERATURE: 99.9 F | HEART RATE: 111 BPM | OXYGEN SATURATION: 98 % | BODY MASS INDEX: 38.92 KG/M2 | DIASTOLIC BLOOD PRESSURE: 70 MMHG

## 2018-06-21 DIAGNOSIS — J01.10 ACUTE NON-RECURRENT FRONTAL SINUSITIS: Primary | ICD-10-CM

## 2018-06-21 PROCEDURE — 99213 OFFICE O/P EST LOW 20 MIN: CPT | Performed by: NURSE PRACTITIONER

## 2018-06-21 PROCEDURE — 3008F BODY MASS INDEX DOCD: CPT | Performed by: NURSE PRACTITIONER

## 2018-06-21 PROCEDURE — 1036F TOBACCO NON-USER: CPT | Performed by: NURSE PRACTITIONER

## 2018-06-21 RX ORDER — KETOCONAZOLE 20 MG/G
CREAM TOPICAL 2 TIMES DAILY
COMMUNITY
Start: 2017-04-05 | End: 2018-06-21 | Stop reason: ALTCHOICE

## 2018-06-21 RX ORDER — ERGOCALCIFEROL 1.25 MG/1
1 CAPSULE ORAL WEEKLY
COMMUNITY
Start: 2016-11-11 | End: 2018-06-21 | Stop reason: ALTCHOICE

## 2018-06-21 RX ORDER — AMOXICILLIN AND CLAVULANATE POTASSIUM 875; 125 MG/1; MG/1
1 TABLET, FILM COATED ORAL EVERY 12 HOURS SCHEDULED
Qty: 14 TABLET | Refills: 0 | Status: SHIPPED | OUTPATIENT
Start: 2018-06-21 | End: 2018-06-28

## 2018-06-21 RX ORDER — TRANEXAMIC ACID 650 1/1
TABLET ORAL EVERY 8 HOURS
COMMUNITY
Start: 2016-11-03 | End: 2018-06-21 | Stop reason: ALTCHOICE

## 2018-06-21 NOTE — PROGRESS NOTES
Assessment/Plan:    Will start around Augmentin for sinus infection  Patient with a low-grade temp and slight tachycardia  Patient is instructed to rest   Warm compresses sinuses  Stay well hydrated  She should return for new or worsening symptoms  Patient is to return for follow-up routine evaluation and lab work  Diagnoses and all orders for this visit:    Acute non-recurrent frontal sinusitis  -     amoxicillin-clavulanate (AUGMENTIN) 875-125 mg per tablet; Take 1 tablet by mouth every 12 (twelve) hours for 7 days    Other orders  -     Discontinue: ergocalciferol (VITAMIN D2) 50,000 units; Take 1 capsule by mouth once a week  -     Discontinue: Tranexamic Acid (LYSTEDA) 650 MG TABS; Take by mouth every 8 (eight) hours  -     Discontinue: ketoconazole (NIZORAL) 2 % cream; Apply topically 2 (two) times a day        Subjective:      Patient ID: Julia Mayorga is a 28 y o  female  URI    This is a new problem  The problem has been gradually worsening  Maximum temperature: subjective fever  Associated symptoms include congestion, coughing (productive - green mucous) and sinus pain  Pertinent negatives include no chest pain, ear pain, headaches, nausea, plugged ear sensation, rhinorrhea, sore throat, vomiting or wheezing  Treatments tried: mucinex and sudafed  The treatment provided no relief  Patient does have a past medical history allergies and receives allergy injections at home  Patient is followed by ENT  The following portions of the patient's history were reviewed and updated as appropriate: allergies, current medications, past family history, past medical history, past social history, past surgical history and problem list     Review of Systems   Constitutional: Positive for chills, fatigue and fever  HENT: Positive for congestion, postnasal drip, sinus pain, sinus pressure and voice change  Negative for ear discharge, ear pain, rhinorrhea and sore throat      Respiratory: Positive for cough (productive - green mucous)  Negative for shortness of breath and wheezing  Cardiovascular: Negative for chest pain and palpitations  Gastrointestinal: Negative for nausea and vomiting  Musculoskeletal: Negative for myalgias  Neurological: Negative for dizziness, light-headedness and headaches  Past Medical History:   Diagnosis Date    Contact dermatitis     Last Assessed [id number]  Resolved 20Oct2016   Epidermal inclusion cyst     Last Assessed [id number]  Resolved 20Oct2016   Hearing loss     Migraine     Skin disorder        No current outpatient prescriptions on file  Allergies   Allergen Reactions    Biaxin [Clarithromycin] Vomiting    Ciprofloxacin Hives    Sulfa Antibiotics Hives    Zithromax [Azithromycin] Vomiting       Social History   Past Surgical History:   Procedure Laterality Date    ADENOIDECTOMY      TOOTH EXTRACTION  2008     Family History   Problem Relation Age of Onset    Depression Mother     Diabetes Father     Hiatal hernia Father     Hypertension Father     Other Paternal Grandmother         Bowel disease    Colon cancer Other        Objective:  /70 (BP Location: Left arm, Patient Position: Sitting, Cuff Size: Standard)   Pulse (!) 111   Temp 99 9 °F (37 7 °C) (Tympanic)   Ht 5' 6 02" (1 677 m)   Wt 110 kg (242 lb 3 2 oz)   SpO2 98%   BMI 39 06 kg/m²      Physical Exam   Constitutional: She is oriented to person, place, and time  She appears well-developed and well-nourished  No distress  HENT:   Head: Normocephalic and atraumatic  Right Ear: Hearing, tympanic membrane, external ear and ear canal normal    Left Ear: Hearing, tympanic membrane, external ear and ear canal normal    Nose: Mucosal edema and rhinorrhea present  Right sinus exhibits frontal sinus tenderness  Right sinus exhibits no maxillary sinus tenderness  Left sinus exhibits frontal sinus tenderness  Left sinus exhibits no maxillary sinus tenderness  Mouth/Throat: Uvula is midline and mucous membranes are normal    Cobblestone irritation to posterior pharynx  No distinct erythema or exudate  Eyes: No scleral icterus  Cardiovascular: Normal rate and regular rhythm  Pulmonary/Chest: Effort normal and breath sounds normal  No respiratory distress  She has no wheezes  Neurological: She is alert and oriented to person, place, and time  Skin: Skin is warm and dry  No rash noted  Psychiatric: She has a normal mood and affect  Her behavior is normal  Judgment and thought content normal    Nursing note and vitals reviewed

## 2018-12-26 ENCOUNTER — OFFICE VISIT (OUTPATIENT)
Dept: INTERNAL MEDICINE CLINIC | Facility: CLINIC | Age: 28
End: 2018-12-26
Payer: COMMERCIAL

## 2018-12-26 VITALS
TEMPERATURE: 98.3 F | OXYGEN SATURATION: 99 % | SYSTOLIC BLOOD PRESSURE: 118 MMHG | BODY MASS INDEX: 40.34 KG/M2 | HEART RATE: 102 BPM | WEIGHT: 257 LBS | DIASTOLIC BLOOD PRESSURE: 80 MMHG | HEIGHT: 67 IN

## 2018-12-26 DIAGNOSIS — Z12.4 SCREENING FOR CERVICAL CANCER: ICD-10-CM

## 2018-12-26 DIAGNOSIS — J03.90 TONSILLITIS: Primary | ICD-10-CM

## 2018-12-26 PROCEDURE — 1036F TOBACCO NON-USER: CPT | Performed by: INTERNAL MEDICINE

## 2018-12-26 PROCEDURE — 99213 OFFICE O/P EST LOW 20 MIN: CPT | Performed by: INTERNAL MEDICINE

## 2018-12-26 RX ORDER — AMOXICILLIN AND CLAVULANATE POTASSIUM 875; 125 MG/1; MG/1
1 TABLET, FILM COATED ORAL EVERY 12 HOURS SCHEDULED
Qty: 20 TABLET | Refills: 0 | Status: SHIPPED | OUTPATIENT
Start: 2018-12-26 | End: 2019-01-05

## 2018-12-26 RX ORDER — TRIAMCINOLONE ACETONIDE 1 MG/G
CREAM TOPICAL AS NEEDED
COMMUNITY
End: 2019-05-20

## 2018-12-26 RX ORDER — CLINDAMYCIN PHOSPHATE 10 UG/ML
1 LOTION TOPICAL AS NEEDED
Refills: 3 | COMMUNITY
Start: 2018-10-30

## 2018-12-26 NOTE — PATIENT INSTRUCTIONS
1  Saline spray to be used back of throat and nostrils  2  Augmentin 875 b i d  times 10 days okay to stop after 7 if complete resolution  3  Fluids at least 64 oz a day  4  Probiotics with each antibiotic dosage 5   Note for work for 2 days

## 2018-12-26 NOTE — PROGRESS NOTES
Assessment/Plan:    No problem-specific Assessment & Plan notes found for this encounter  There are no diagnoses linked to this encounter  Subjective:      Patient ID: Clinton Bailon is a 33 y o  female  Sore Throat    This is a recurrent problem  The current episode started in the past 7 days  The problem has been unchanged  There has been no fever  The pain is moderate  Associated symptoms include congestion, drooling, a hoarse voice, stridor, swollen glands and trouble swallowing  She has tried cool liquids for the symptoms  The treatment provided mild relief  The following portions of the patient's history were reviewed and updated as appropriate: past family history, past medical history, past social history, past surgical history and problem list     Review of Systems   Constitutional: Positive for fatigue  HENT: Positive for congestion, drooling, hoarse voice, sore throat and trouble swallowing  Respiratory: Positive for stridor  Allergic/Immunologic: Positive for environmental allergies  Hematological: Positive for adenopathy  All other systems reviewed and are negative  Past Medical History:   Diagnosis Date    Contact dermatitis     Last Assessed [id number]  Resolved [id number]   Epidermal inclusion cyst     Last Assessed [id number]  Resolved [id number]   Hearing loss     Migraine     Skin disorder        No current outpatient prescriptions on file      Allergies   Allergen Reactions    Biaxin [Clarithromycin] Vomiting    Ciprofloxacin Hives    Sulfa Antibiotics Hives    Zithromax [Azithromycin] Vomiting       Social History   Past Surgical History:   Procedure Laterality Date    ADENOIDECTOMY      TOOTH EXTRACTION  2008     Family History   Problem Relation Age of Onset    Depression Mother     Diabetes Father     Hiatal hernia Father     Hypertension Father     Other Paternal Grandmother         Bowel disease    Colon cancer Other Objective: There were no vitals taken for this visit  No results found for this or any previous visit (from the past 1344 hour(s))  Physical Exam   Constitutional: She appears well-developed and well-nourished  She appears distressed  HENT:   Head: Normocephalic and atraumatic  Mouth/Throat: Mucous membranes are normal  Uvula swelling present  Posterior oropharyngeal edema and posterior oropharyngeal erythema present  No oropharyngeal exudate  Eyes: Pupils are equal, round, and reactive to light  Conjunctivae and EOM are normal  No scleral icterus  Neck: Normal range of motion  Neck supple  Cardiovascular: Normal rate and regular rhythm  Pulmonary/Chest: Effort normal  No respiratory distress  Lymphadenopathy:     She has cervical adenopathy

## 2018-12-31 ENCOUNTER — OFFICE VISIT (OUTPATIENT)
Dept: INTERNAL MEDICINE CLINIC | Facility: CLINIC | Age: 28
End: 2018-12-31
Payer: COMMERCIAL

## 2018-12-31 VITALS
BODY MASS INDEX: 40.31 KG/M2 | WEIGHT: 250.8 LBS | HEIGHT: 66 IN | TEMPERATURE: 98.1 F | HEART RATE: 92 BPM | SYSTOLIC BLOOD PRESSURE: 120 MMHG | DIASTOLIC BLOOD PRESSURE: 88 MMHG | OXYGEN SATURATION: 98 %

## 2018-12-31 DIAGNOSIS — J01.10 ACUTE NON-RECURRENT FRONTAL SINUSITIS: Primary | ICD-10-CM

## 2018-12-31 PROCEDURE — 99213 OFFICE O/P EST LOW 20 MIN: CPT | Performed by: INTERNAL MEDICINE

## 2018-12-31 RX ORDER — ALBUTEROL SULFATE 90 UG/1
2 AEROSOL, METERED RESPIRATORY (INHALATION) EVERY 6 HOURS PRN
Qty: 1 INHALER | Refills: 0 | Status: SHIPPED | OUTPATIENT
Start: 2018-12-31 | End: 2019-05-20

## 2018-12-31 RX ORDER — BENZONATATE 100 MG/1
100 CAPSULE ORAL 3 TIMES DAILY PRN
Qty: 20 CAPSULE | Refills: 0 | Status: SHIPPED | OUTPATIENT
Start: 2018-12-31 | End: 2019-05-20

## 2018-12-31 NOTE — PROGRESS NOTES
Assessment/Plan:    Acute non-recurrent frontal sinusitis  Continue with Augmentin therapy until complete, was prescribed a 10 day course at b i d  Continue with Mucinex for congestion  Will prescribe benzonatate t i d  as needed for cough and will also prescribe an albuterol inhaler as needed for shortness of breath/wheezing  Diagnoses and all orders for this visit:    Acute non-recurrent frontal sinusitis  -     benzonatate (TESSALON PERLES) 100 mg capsule; Take 1 capsule (100 mg total) by mouth 3 (three) times a day as needed for cough  -     albuterol (VENTOLIN HFA) 90 mcg/act inhaler; Inhale 2 puffs every 6 (six) hours as needed for wheezing or shortness of breath          Subjective:      Patient ID: Joshua Hernandez is a 33 y o  female  79-year-old female is seen today with worsening upper respiratory infection symptoms  She was seen on 12/26/2018 and was started on Augmentin, to which she has been compliant  Currently, she is experiencing coughing and tightening of the chest  She has been taking mucinex for over a week now  Cough   This is a new problem  The current episode started in the past 7 days  The problem has been unchanged  The cough is non-productive  Associated symptoms include postnasal drip, rhinorrhea and shortness of breath  Pertinent negatives include no chest pain, chills, ear congestion, ear pain, fever, headaches, heartburn, hemoptysis, myalgias, nasal congestion, rash, sore throat, sweats, weight loss or wheezing  The following portions of the patient's history were reviewed and updated as appropriate: allergies, current medications, past family history, past medical history, past social history, past surgical history and problem list     Review of Systems   Constitutional: Negative for activity change, appetite change, chills, diaphoresis, fatigue, fever and weight loss  HENT: Positive for postnasal drip and rhinorrhea   Negative for congestion, ear pain, sinus pain, sinus pressure, sneezing and sore throat  Eyes: Negative for visual disturbance  Respiratory: Positive for cough and shortness of breath  Negative for apnea, hemoptysis, choking, chest tightness and wheezing  Cardiovascular: Negative for chest pain, palpitations and leg swelling  Gastrointestinal: Negative for abdominal distention, abdominal pain, anal bleeding, blood in stool, constipation, diarrhea, heartburn, nausea and vomiting  Endocrine: Negative for cold intolerance and heat intolerance  Genitourinary: Negative for difficulty urinating, dysuria and hematuria  Musculoskeletal: Negative  Negative for myalgias  Skin: Negative  Negative for rash  Neurological: Negative for dizziness, weakness, light-headedness, numbness and headaches  Hematological: Negative for adenopathy  Psychiatric/Behavioral: Negative for agitation, sleep disturbance and suicidal ideas  All other systems reviewed and are negative  Past Medical History:   Diagnosis Date    Contact dermatitis     Last Assessed 21Jul2016  Resolved [email or web address]   Epidermal inclusion cyst     Last Assessed [id number]  Resolved [id number]      Hearing loss     Migraine     Skin disorder          Current Outpatient Prescriptions:     amoxicillin-clavulanate (AUGMENTIN) 875-125 mg per tablet, Take 1 tablet by mouth every 12 (twelve) hours for 10 days, Disp: 20 tablet, Rfl: 0    clindamycin (CLEOCIN T) 1 % lotion, APPLY SPARINGLY TO AFFECTED AREA EVERY DAY, Disp: , Rfl: 3    triamcinolone (KENALOG) 0 1 % cream, Apply topically as needed, Disp: , Rfl:     albuterol (VENTOLIN HFA) 90 mcg/act inhaler, Inhale 2 puffs every 6 (six) hours as needed for wheezing or shortness of breath, Disp: 1 Inhaler, Rfl: 0    benzonatate (TESSALON PERLES) 100 mg capsule, Take 1 capsule (100 mg total) by mouth 3 (three) times a day as needed for cough, Disp: 20 capsule, Rfl: 0    Allergies   Allergen Reactions    Biaxin [Clarithromycin] Vomiting    Ciprofloxacin Hives    Sulfa Antibiotics Hives    Zithromax [Azithromycin] Vomiting       Social History   Past Surgical History:   Procedure Laterality Date    ADENOIDECTOMY      TOOTH EXTRACTION  2008     Family History   Problem Relation Age of Onset    Depression Mother     Diabetes Father     Hiatal hernia Father     Hypertension Father     Other Paternal Grandmother         Bowel disease    Colon cancer Other        Objective:  /88 (BP Location: Left arm, Patient Position: Sitting, Cuff Size: Large)   Pulse 92   Temp 98 1 °F (36 7 °C) (Oral)   Ht 5' 6" (1 676 m) Comment: with shoes on  Wt 114 kg (250 lb 12 8 oz) Comment: with shoes on  SpO2 98% Comment: room air  BMI 40 48 kg/m²     No results found for this or any previous visit (from the past 1344 hour(s))  Physical Exam   Constitutional: She is oriented to person, place, and time  She appears well-developed and well-nourished  No distress  HENT:   Head: Normocephalic and atraumatic  Eyes: Pupils are equal, round, and reactive to light  Conjunctivae and EOM are normal  Right eye exhibits no discharge  Left eye exhibits no discharge  Neck: Normal range of motion  Neck supple  No JVD present  No thyromegaly present  Cardiovascular: Normal rate, regular rhythm, normal heart sounds and intact distal pulses  Exam reveals no gallop and no friction rub  No murmur heard  Pulmonary/Chest: Effort normal and breath sounds normal  No respiratory distress  She has no wheezes  She has no rales  She exhibits no tenderness  Abdominal: Soft  She exhibits no distension  There is no tenderness  Musculoskeletal: Normal range of motion  She exhibits no edema, tenderness or deformity  Lymphadenopathy:     She has no cervical adenopathy  Neurological: She is alert and oriented to person, place, and time  No cranial nerve deficit  Coordination normal    Skin: Skin is warm and dry  No rash noted  She is not diaphoretic  No erythema  No pallor  Psychiatric: She has a normal mood and affect  Her behavior is normal  Judgment and thought content normal    Nursing note and vitals reviewed

## 2018-12-31 NOTE — ASSESSMENT & PLAN NOTE
Continue with Augmentin therapy until complete, was prescribed a 10 day course at b i d  Continue with Mucinex for congestion  Will prescribe benzonatate t i d  as needed for cough and will also prescribe an albuterol inhaler as needed for shortness of breath/wheezing

## 2019-02-13 ENCOUNTER — OFFICE VISIT (OUTPATIENT)
Dept: INTERNAL MEDICINE CLINIC | Facility: CLINIC | Age: 29
End: 2019-02-13
Payer: COMMERCIAL

## 2019-02-13 VITALS
WEIGHT: 248.7 LBS | SYSTOLIC BLOOD PRESSURE: 112 MMHG | BODY MASS INDEX: 39.97 KG/M2 | HEART RATE: 93 BPM | TEMPERATURE: 98.9 F | DIASTOLIC BLOOD PRESSURE: 78 MMHG | HEIGHT: 66 IN | OXYGEN SATURATION: 98 %

## 2019-02-13 DIAGNOSIS — R10.11 RUQ PAIN: Primary | ICD-10-CM

## 2019-02-13 DIAGNOSIS — K59.00 CONSTIPATION, UNSPECIFIED CONSTIPATION TYPE: ICD-10-CM

## 2019-02-13 DIAGNOSIS — E66.01 OBESITY, CLASS III, BMI 40-49.9 (MORBID OBESITY) (HCC): ICD-10-CM

## 2019-02-13 DIAGNOSIS — Z13.228 SCREENING FOR METABOLIC DISORDER: ICD-10-CM

## 2019-02-13 PROBLEM — E66.813 OBESITY, CLASS III, BMI 40-49.9 (MORBID OBESITY): Status: ACTIVE | Noted: 2019-02-13

## 2019-02-13 LAB
ALBUMIN SERPL BCP-MCNC: 4 G/DL (ref 3.5–5)
ALP SERPL-CCNC: 79 U/L (ref 46–116)
ALT SERPL W P-5'-P-CCNC: 30 U/L (ref 12–78)
ANION GAP SERPL CALCULATED.3IONS-SCNC: 7 MMOL/L (ref 4–13)
AST SERPL W P-5'-P-CCNC: 15 U/L (ref 5–45)
BASOPHILS # BLD AUTO: 0.03 THOUSANDS/ΜL (ref 0–0.1)
BASOPHILS NFR BLD AUTO: 0 % (ref 0–1)
BILIRUB SERPL-MCNC: 0.67 MG/DL (ref 0.2–1)
BUN SERPL-MCNC: 12 MG/DL (ref 5–25)
CALCIUM SERPL-MCNC: 8.8 MG/DL (ref 8.3–10.1)
CHLORIDE SERPL-SCNC: 107 MMOL/L (ref 100–108)
CHOLEST SERPL-MCNC: 162 MG/DL (ref 50–200)
CO2 SERPL-SCNC: 26 MMOL/L (ref 21–32)
CREAT SERPL-MCNC: 0.81 MG/DL (ref 0.6–1.3)
EOSINOPHIL # BLD AUTO: 0.13 THOUSAND/ΜL (ref 0–0.61)
EOSINOPHIL NFR BLD AUTO: 2 % (ref 0–6)
ERYTHROCYTE [DISTWIDTH] IN BLOOD BY AUTOMATED COUNT: 13.2 % (ref 11.6–15.1)
EST. AVERAGE GLUCOSE BLD GHB EST-MCNC: 108 MG/DL
GFR SERPL CREATININE-BSD FRML MDRD: 98 ML/MIN/1.73SQ M
GLUCOSE SERPL-MCNC: 85 MG/DL (ref 65–140)
HBA1C MFR BLD: 5.4 % (ref 4.2–6.3)
HCT VFR BLD AUTO: 39.3 % (ref 34.8–46.1)
HDLC SERPL-MCNC: 42 MG/DL (ref 40–60)
HGB BLD-MCNC: 12.3 G/DL (ref 11.5–15.4)
IMM GRANULOCYTES # BLD AUTO: 0.03 THOUSAND/UL (ref 0–0.2)
IMM GRANULOCYTES NFR BLD AUTO: 0 % (ref 0–2)
LDLC SERPL CALC-MCNC: 105 MG/DL (ref 0–100)
LYMPHOCYTES # BLD AUTO: 1.94 THOUSANDS/ΜL (ref 0.6–4.47)
LYMPHOCYTES NFR BLD AUTO: 24 % (ref 14–44)
MCH RBC QN AUTO: 26.9 PG (ref 26.8–34.3)
MCHC RBC AUTO-ENTMCNC: 31.3 G/DL (ref 31.4–37.4)
MCV RBC AUTO: 86 FL (ref 82–98)
MONOCYTES # BLD AUTO: 0.5 THOUSAND/ΜL (ref 0.17–1.22)
MONOCYTES NFR BLD AUTO: 6 % (ref 4–12)
NEUTROPHILS # BLD AUTO: 5.56 THOUSANDS/ΜL (ref 1.85–7.62)
NEUTS SEG NFR BLD AUTO: 68 % (ref 43–75)
NONHDLC SERPL-MCNC: 120 MG/DL
NRBC BLD AUTO-RTO: 0 /100 WBCS
PLATELET # BLD AUTO: 249 THOUSANDS/UL (ref 149–390)
PMV BLD AUTO: 11 FL (ref 8.9–12.7)
POTASSIUM SERPL-SCNC: 4.3 MMOL/L (ref 3.5–5.3)
PROT SERPL-MCNC: 7.7 G/DL (ref 6.4–8.2)
RBC # BLD AUTO: 4.57 MILLION/UL (ref 3.81–5.12)
SODIUM SERPL-SCNC: 140 MMOL/L (ref 136–145)
TRIGL SERPL-MCNC: 76 MG/DL
TSH SERPL DL<=0.05 MIU/L-ACNC: 3.27 UIU/ML (ref 0.36–3.74)
WBC # BLD AUTO: 8.19 THOUSAND/UL (ref 4.31–10.16)

## 2019-02-13 PROCEDURE — 84443 ASSAY THYROID STIM HORMONE: CPT | Performed by: NURSE PRACTITIONER

## 2019-02-13 PROCEDURE — 3008F BODY MASS INDEX DOCD: CPT | Performed by: NURSE PRACTITIONER

## 2019-02-13 PROCEDURE — 80053 COMPREHEN METABOLIC PANEL: CPT | Performed by: NURSE PRACTITIONER

## 2019-02-13 PROCEDURE — 99214 OFFICE O/P EST MOD 30 MIN: CPT | Performed by: NURSE PRACTITIONER

## 2019-02-13 PROCEDURE — 80061 LIPID PANEL: CPT | Performed by: NURSE PRACTITIONER

## 2019-02-13 PROCEDURE — 85025 COMPLETE CBC W/AUTO DIFF WBC: CPT | Performed by: NURSE PRACTITIONER

## 2019-02-13 PROCEDURE — 83036 HEMOGLOBIN GLYCOSYLATED A1C: CPT | Performed by: NURSE PRACTITIONER

## 2019-02-13 PROCEDURE — 36415 COLL VENOUS BLD VENIPUNCTURE: CPT | Performed by: NURSE PRACTITIONER

## 2019-02-13 RX ORDER — DICYCLOMINE HCL 20 MG
20 TABLET ORAL 2 TIMES DAILY
Qty: 60 TABLET | Refills: 0 | Status: SHIPPED | OUTPATIENT
Start: 2019-02-13 | End: 2019-04-29 | Stop reason: SDUPTHER

## 2019-02-13 RX ORDER — OMEPRAZOLE 40 MG/1
40 CAPSULE, DELAYED RELEASE ORAL DAILY
Qty: 30 CAPSULE | Refills: 1 | Status: SHIPPED | OUTPATIENT
Start: 2019-02-13 | End: 2019-04-29 | Stop reason: SDUPTHER

## 2019-02-13 RX ORDER — SYRINGE WITH NEEDLE, 1 ML 28GX1/2"
SYRINGE, EMPTY DISPOSABLE MISCELLANEOUS
Refills: 0 | COMMUNITY
Start: 2019-02-05 | End: 2019-05-20

## 2019-02-13 NOTE — ASSESSMENT & PLAN NOTE
Whether or not you are at risk for gallstones, it's always a good idea to keep your body at a healthy weight and eat a diet that is low in fat and cholesterol, moderate in calories, and high in fiber  All of the following are healthy foods for your gallbladder, as well as the rest of your body:  · Fresh fruits and vegetables  · Whole grains (whole-wheat bread, brown rice, oats, bran cereal)  · Lean meat, poultry, and fish  · Low-fat dairy products  Will get US of RUQ to r/o gall stones  Will start patient on Prilosec and Benytl  Will get blood-work and plan on follow-up with patient in one week  Discsussed red flag signs with patient and when to report to the Er

## 2019-02-13 NOTE — LETTER
February 13, 2019     Patient: Francisco Avilez   YOB: 1990   Date of Visit: 2/13/2019       To Whom it May Concern:    Jodibisi Peña is under my professional care  She was seen in my office on 2/13/2019  She may return to work on 2/13/19  If you have any questions or concerns, please don't hesitate to call           Sincerely,          JOSIE Rosales        CC: No Recipients

## 2019-02-13 NOTE — PATIENT INSTRUCTIONS
Whether or not you are at risk for gallstones, it's always a good idea to keep your body at a healthy weight and eat a diet that is low in fat and cholesterol, moderate in calories, and high in fiber    All of the following are healthy foods for your gallbladder, as well as the rest of your body:  · Fresh fruits and vegetables  · Whole grains (whole-wheat bread, brown rice, oats, bran cereal)  · Lean meat, poultry, and fish  · Low-fat dairy products

## 2019-02-13 NOTE — PROGRESS NOTES
Assessment/Plan:    RUQ pain  Whether or not you are at risk for gallstones, it's always a good idea to keep your body at a healthy weight and eat a diet that is low in fat and cholesterol, moderate in calories, and high in fiber  All of the following are healthy foods for your gallbladder, as well as the rest of your body:  · Fresh fruits and vegetables  · Whole grains (whole-wheat bread, brown rice, oats, bran cereal)  · Lean meat, poultry, and fish  · Low-fat dairy products  Will get US of RUQ to r/o gall stones  Will start patient on Prilosec and Benytl  Will get blood-work and plan on follow-up with patient in one week  Discsussed red flag signs with patient and when to report to the Er  Constipation  Will advise patient to continue with stool softner and stay well hydrated and eat a diet high in fiber  Screening for metabolic disorder  Will get blood work  BMI Counseling: Body mass index is 40 14 kg/m²  Discussed the patient's BMI with her  The BMI is above average  BMI counseling and education was provided to the patient  Nutrition recommendations include reducing portion sizes, decreasing overall calorie intake, 3-5 servings of fruits/vegetables daily, reducing fast food intake, consuming healthier snacks, decreasing soda and/or juice intake, moderation in carbohydrate intake, increasing intake of lean protein, reducing intake of saturated fat and trans fat and reducing intake of cholesterol  Exercise recommendations include exercising 3-5 times per week  Diagnoses and all orders for this visit:    RUQ pain  -     US right upper quadrant; Future  -     omeprazole (PriLOSEC) 40 MG capsule; Take 1 capsule (40 mg total) by mouth daily  -     dicyclomine (BENTYL) 20 mg tablet;  Take 1 tablet (20 mg total) by mouth 2 (two) times a day    Obesity, Class III, BMI 40-49 9 (morbid obesity) (HCC)  -     Hemoglobin A1C  -     Lipid panel    Screening for metabolic disorder  -     CBC and differential  -     Comprehensive metabolic panel; Future  -     Hemoglobin A1C  -     Lipid panel  -     Comprehensive metabolic panel    Constipation, unspecified constipation type  -     TSH, 3rd generation with Free T4 reflex    Other orders  -     B-D ALLERGY SYRINGE 1CC/28G 28G X 1/2" 1 ML MISC; As directed          Subjective:   33year old female presents for RUQ pain for past two weeks  Patient states that the pain most often builds up and is of sharp quality  However, sometimes the pain can be sharp  The pain right now is 3/10 but at its worst it is a 6/10  The pain radiates towards the back on the right side  Pain is worsened after eating especially after dinner because that is her heaviest meal  However the onset pain can be spontaneous  Pain is also worsened after taking a deep breath and palpation  When the pain is at the peak she is unable to lay on that side  She has been taking antacids and Naproxen OTC for the past two weeks and that has little to help her pain  She feels that nothing improves her pain significantly  She has been constipated since the onset of pain and had to take a stool softner twice  She states that color of her stool remains unchanged and there is no bleeding  She denies any nausea, vomiting, fever, new rashes (but has notices that her face is constantly flushed since the onset pain)  Patient also states that her diet is not the best for the past two months but she does try incorporate fruits, vegetables, and lean meats  She does not smoke, and drinks occasionally  She had two beers a week ago and it did not affect her pain  Patient was  Hospitalized a year and a half ago for similar pain and had an ultra sound which did not demonstrate any significant findings  She was reccommended to take antacids for possible ulcer  She states that her previous episode was more intense in regards to pain but did not last this long  LBM this morning     Patient ID: Nelda Ashajuan pablo is a 33 y o  female  HPI    The following portions of the patient's history were reviewed and updated as appropriate: allergies, current medications, past family history, past medical history, past social history, past surgical history and problem list     Review of Systems   Constitutional: Positive for appetite change (Does not like to eat becasue of pain), chills, diaphoresis and fatigue  Negative for activity change and fever  HENT: Negative for congestion, ear pain, hearing loss, postnasal drip, sinus pain and sore throat  Respiratory: Positive for chest tightness  Negative for cough and shortness of breath  Gastrointestinal: Positive for abdominal pain (RUQ), constipation (x 2 weeks), diarrhea and nausea  Negative for blood in stool and vomiting  Endocrine: Negative for cold intolerance and heat intolerance  Genitourinary: Positive for flank pain (right )  Negative for difficulty urinating, menstrual problem and urgency  Skin: Negative for rash  Neurological: Positive for dizziness and headaches (associated with allergies)  Objective:      /78 (BP Location: Left arm, Patient Position: Sitting, Cuff Size: Standard)   Pulse 93   Temp 98 9 °F (37 2 °C) (Oral)   Ht 5' 6" (1 676 m)   Wt 113 kg (248 lb 11 2 oz)   SpO2 98%   BMI 40 14 kg/m²          Physical Exam   Constitutional: She is oriented to person, place, and time  She appears well-developed and well-nourished  No distress  obese   HENT:   Head: Normocephalic and atraumatic  Right Ear: External ear normal    Left Ear: External ear normal    Nose: Nose normal    Mouth/Throat: Oropharynx is clear and moist  No oropharyngeal exudate  Eyes: Pupils are equal, round, and reactive to light  Conjunctivae and EOM are normal  Right eye exhibits no discharge  Left eye exhibits no discharge  Neck: Normal range of motion  Neck supple  No thyromegaly present     Cardiovascular: Normal rate, regular rhythm, normal heart sounds and intact distal pulses  Exam reveals no gallop and no friction rub  No murmur heard  Pulmonary/Chest: Effort normal and breath sounds normal  No stridor  No respiratory distress  She has no wheezes  She has no rales  Abdominal: Soft  Normal appearance and bowel sounds are normal  She exhibits no distension  There is tenderness in the right upper quadrant  There is no guarding, no CVA tenderness and negative Bennett's sign  Lymphadenopathy:     She has no cervical adenopathy  Neurological: She is alert and oriented to person, place, and time  Skin: Skin is warm and dry  No rash noted  She is not diaphoretic  No erythema  Psychiatric: She has a normal mood and affect   Her behavior is normal  Judgment and thought content normal

## 2019-02-13 NOTE — ASSESSMENT & PLAN NOTE
Will advise patient to continue with stool softner and stay well hydrated and eat a diet high in fiber

## 2019-02-20 ENCOUNTER — OFFICE VISIT (OUTPATIENT)
Dept: INTERNAL MEDICINE CLINIC | Age: 29
End: 2019-02-20
Payer: COMMERCIAL

## 2019-02-20 VITALS
SYSTOLIC BLOOD PRESSURE: 124 MMHG | DIASTOLIC BLOOD PRESSURE: 78 MMHG | TEMPERATURE: 97.1 F | OXYGEN SATURATION: 98 % | HEART RATE: 66 BPM | BODY MASS INDEX: 40.03 KG/M2 | WEIGHT: 248 LBS

## 2019-02-20 DIAGNOSIS — K59.00 CONSTIPATION, UNSPECIFIED CONSTIPATION TYPE: ICD-10-CM

## 2019-02-20 DIAGNOSIS — R10.11 RUQ PAIN: Primary | ICD-10-CM

## 2019-02-20 DIAGNOSIS — E66.01 OBESITY, CLASS III, BMI 40-49.9 (MORBID OBESITY) (HCC): ICD-10-CM

## 2019-02-20 PROBLEM — Z13.228 SCREENING FOR METABOLIC DISORDER: Status: RESOLVED | Noted: 2019-02-13 | Resolved: 2019-02-20

## 2019-02-20 PROCEDURE — 99214 OFFICE O/P EST MOD 30 MIN: CPT | Performed by: NURSE PRACTITIONER

## 2019-02-20 NOTE — ASSESSMENT & PLAN NOTE
Patient states she has been having regular bowel movements with the stool softeners, advised patient to continue to stay well hydrated and eat a diet high in fiber

## 2019-02-20 NOTE — ASSESSMENT & PLAN NOTE
Discussed modifications to diet and increasing exercise  Patient was noted to have a mildly elevated LDL, will continue to montior  Recommend healthy lifestyle choices for your cholesterol  Low fat/low cholesterol diet  Limit/avoid red meat  Eat more lean meat - chicken breast, ground turkey, fish  Exercise 30 mins at least 3 times a week as tolerated

## 2019-02-20 NOTE — PROGRESS NOTES
Assessment/Plan:    RUQ pain  Whether or not you are at risk for gallstones, it's always a good idea to keep your body at a healthy weight and eat a diet that is low in fat and cholesterol, moderate in calories, and high in fiber  All of the following are healthy foods for your gallbladder, as well as the rest of your body:  · Fresh fruits and vegetables  · Whole grains (whole-wheat bread, brown rice, oats, bran cereal)  · Lean meat, poultry, and fish  · Low-fat dairy products  Patient has not done RUQ US  Will get US of RUQ to r/o gall stones  Patient will continue with Prilosec for 6 weeks, and take Benytl as needed  Constipation   Patient states she has been having regular bowel movements with the stool softeners, advised patient to continue to stay well hydrated and eat a diet high in fiber  Obesity, Class III, BMI 40-49 9 (morbid obesity) ([facility] )    Discussed modifications to diet and increasing exercise  Patient was noted to have a mildly elevated LDL, will continue to montior  Recommend healthy lifestyle choices for your cholesterol  Low fat/low cholesterol diet  Limit/avoid red meat  Eat more lean meat - chicken breast, ground turkey, fish  Exercise 30 mins at least 3 times a week as tolerated  Diagnoses and all orders for this visit:    RUQ pain    Constipation, unspecified constipation type    Obesity, Class III, BMI 40-49 9 (morbid obesity) ([facility])          Subjective:      Patient ID: Pearl Farnsworth is a 33 y o  female  Not as frequent, comes and goes, wakes up with it or after dinner, stress seems to flare it up       33year old female presents for RUQ pain for past two weeks  Patient states that the pain most often builds up and is of sharp quality  However, sometimes the pain can be sharp  The pain right now is 3/10 but at its worst it is a 6/10  The pain radiates towards the back on the right side   Pain is worsened after eating especially after dinner because that is her heaviest meal  However the onset pain can be spontaneous  Pain is also worsened after taking a deep breath and palpation  When the pain is at the peak she is unable to lay on that side  She has been taking antacids and Naproxen OTC for the past two weeks and that has little to help her pain  She feels that nothing improves her pain significantly  She has been constipated since the onset of pain and had to take a stool softner twice  She states that color of her stool remains unchanged and there is no bleeding  She denies any nausea, vomiting, fever, new rashes (but has notices that her face is constantly flushed since the onset pain)  Patient also states that her diet is not the best for the past two months but she does try incorporate fruits, vegetables, and lean meats  She does not smoke, and drinks occasionally  She had two beers a week ago and it did not affect her pain  Patient was  Hospitalized a year and a half ago for similar pain and had an ultra sound which did not demonstrate any significant findings  She was reccommended to take antacids for possible ulcer  She states that her previous episode was more intense in regards to pain but did not last this long  LBM this morning  The following portions of the patient's history were reviewed and updated as appropriate: allergies, current medications, past family history, past medical history, past social history, past surgical history and problem list     Review of Systems   Constitutional: Negative for activity change, appetite change, chills, diaphoresis and fever  HENT: Negative for congestion, ear discharge, ear pain, postnasal drip, rhinorrhea, sinus pressure, sinus pain and sore throat  Eyes: Negative for pain, discharge, itching and visual disturbance  Respiratory: Negative for cough, chest tightness, shortness of breath and wheezing  Cardiovascular: Negative for chest pain, palpitations and leg swelling     Gastrointestinal: Negative for abdominal pain, blood in stool, constipation, diarrhea, nausea and vomiting  Endocrine: Negative for polydipsia, polyphagia and polyuria  Genitourinary: Negative for difficulty urinating, dysuria, hematuria and urgency  Musculoskeletal: Negative for arthralgias, back pain and neck pain  Skin: Negative for rash and wound  Neurological: Negative for dizziness, weakness, numbness and headaches  Past Medical History:   Diagnosis Date    Contact dermatitis     Last Assessed [id number]  Resolved [id number]   Epidermal inclusion cyst     Last Assessed [id number]  Resolved [id number]      Hearing loss     Migraine     Skin disorder          Current Outpatient Medications:     B-D ALLERGY SYRINGE 1CC/28G 28G X 1/2" 1 ML MISC, Every other week, Disp: , Rfl: 0    clindamycin (CLEOCIN T) 1 % lotion, APPLY SPARINGLY TO AFFECTED AREA EVERY DAY, Disp: , Rfl: 3    dicyclomine (BENTYL) 20 mg tablet, Take 1 tablet (20 mg total) by mouth 2 (two) times a day, Disp: 60 tablet, Rfl: 0    omeprazole (PriLOSEC) 40 MG capsule, Take 1 capsule (40 mg total) by mouth daily, Disp: 30 capsule, Rfl: 1    albuterol (VENTOLIN HFA) 90 mcg/act inhaler, Inhale 2 puffs every 6 (six) hours as needed for wheezing or shortness of breath (Patient not taking: Reported on 2/20/2019), Disp: 1 Inhaler, Rfl: 0    benzonatate (TESSALON PERLES) 100 mg capsule, Take 1 capsule (100 mg total) by mouth 3 (three) times a day as needed for cough (Patient not taking: Reported on 2/13/2019), Disp: 20 capsule, Rfl: 0    triamcinolone (KENALOG) 0 1 % cream, Apply topically as needed, Disp: , Rfl:     Allergies   Allergen Reactions    Biaxin [Clarithromycin] Vomiting    Ciprofloxacin Hives    Sulfa Antibiotics Hives    Zithromax [Azithromycin] Vomiting       Social History   Past Surgical History:   Procedure Laterality Date    ADENOIDECTOMY      TOOTH EXTRACTION  2008     Family History   Problem Relation Age of Onset    Depression Mother     Diabetes Father     Hiatal hernia Father     Hypertension Father     Other Paternal Grandmother         Bowel disease    Colon cancer Other        Objective:  /78 (BP Location: Left arm, Patient Position: Sitting, Cuff Size: Large)   Pulse 66   Temp (!) 97 1 °F (36 2 °C) (Tympanic)   Wt 112 kg (248 lb)   SpO2 98%   BMI 40 03 kg/m²     Recent Results (from the past 1344 hour(s))   CBC and differential    Collection Time: 02/13/19 11:42 AM   Result Value Ref Range    WBC 8 19 4 31 - 10 16 Thousand/uL    RBC 4 57 3 81 - 5 12 Million/uL    Hemoglobin 12 3 11 5 - 15 4 g/dL    Hematocrit 39 3 34 8 - 46 1 %    MCV 86 82 - 98 fL    MCH 26 9 26 8 - 34 3 pg    MCHC 31 3 (L) 31 4 - 37 4 g/dL    RDW 13 2 11 6 - 15 1 %    MPV 11 0 8 9 - 12 7 fL    Platelets 345 939 - 129 Thousands/uL    nRBC 0 /100 WBCs    Neutrophils Relative 68 43 - 75 %    Immat GRANS % 0 0 - 2 %    Lymphocytes Relative 24 14 - 44 %    Monocytes Relative 6 4 - 12 %    Eosinophils Relative 2 0 - 6 %    Basophils Relative 0 0 - 1 %    Neutrophils Absolute 5 56 1 85 - 7 62 Thousands/µL    Immature Grans Absolute 0 03 0 00 - 0 20 Thousand/uL    Lymphocytes Absolute 1 94 0 60 - 4 47 Thousands/µL    Monocytes Absolute 0 50 0 17 - 1 22 Thousand/µL    Eosinophils Absolute 0 13 0 00 - 0 61 Thousand/µL    Basophils Absolute 0 03 0 00 - 0 10 Thousands/µL   Hemoglobin A1C    Collection Time: 02/13/19 11:42 AM   Result Value Ref Range    Hemoglobin A1C 5 4 4 2 - 6 3 %     mg/dl   Lipid panel    Collection Time: 02/13/19 11:42 AM   Result Value Ref Range    Cholesterol 162 50 - 200 mg/dL    Triglycerides 76 <=150 mg/dL    HDL, Direct 42 40 - 60 mg/dL    LDL Calculated 105 (H) 0 - 100 mg/dL    Non-HDL-Chol (CHOL-HDL) 120 mg/dl   TSH, 3rd generation with Free T4 reflex    Collection Time: 02/13/19 11:42 AM   Result Value Ref Range    TSH 3RD GENERATON 3 270 0 358 - 3 740 uIU/mL   Comprehensive metabolic panel    Collection Time: 02/13/19 11:42 AM   Result Value Ref Range    Sodium 140 136 - 145 mmol/L    Potassium 4 3 3 5 - 5 3 mmol/L    Chloride 107 100 - 108 mmol/L    CO2 26 21 - 32 mmol/L    ANION GAP 7 4 - 13 mmol/L    BUN 12 5 - 25 mg/dL    Creatinine 0 81 0 60 - 1 30 mg/dL    Glucose 85 65 - 140 mg/dL    Calcium 8 8 8 3 - 10 1 mg/dL    AST 15 5 - 45 U/L    ALT 30 12 - 78 U/L    Alkaline Phosphatase 79 46 - 116 U/L    Total Protein 7 7 6 4 - 8 2 g/dL    Albumin 4 0 3 5 - 5 0 g/dL    Total Bilirubin 0 67 0 20 - 1 00 mg/dL    eGFR 98 ml/min/1 73sq m            Physical Exam

## 2019-02-20 NOTE — ASSESSMENT & PLAN NOTE
Whether or not you are at risk for gallstones, it's always a good idea to keep your body at a healthy weight and eat a diet that is low in fat and cholesterol, moderate in calories, and high in fiber  All of the following are healthy foods for your gallbladder, as well as the rest of your body:  · Fresh fruits and vegetables  · Whole grains (whole-wheat bread, brown rice, oats, bran cereal)  · Lean meat, poultry, and fish  · Low-fat dairy products  Patient has not done RUQ US  Will get US of RUQ to r/o gall stones  Patient will continue with Prilosec for 6 weeks, and take Benytl as needed

## 2019-03-04 ENCOUNTER — OFFICE VISIT (OUTPATIENT)
Dept: INTERNAL MEDICINE CLINIC | Facility: CLINIC | Age: 29
End: 2019-03-04
Payer: COMMERCIAL

## 2019-03-04 VITALS
DIASTOLIC BLOOD PRESSURE: 78 MMHG | WEIGHT: 251 LBS | TEMPERATURE: 97.8 F | SYSTOLIC BLOOD PRESSURE: 116 MMHG | HEART RATE: 82 BPM | BODY MASS INDEX: 40.51 KG/M2 | OXYGEN SATURATION: 99 %

## 2019-03-04 DIAGNOSIS — H10.9 BACTERIAL CONJUNCTIVITIS OF LEFT EYE: Primary | ICD-10-CM

## 2019-03-04 PROCEDURE — 1036F TOBACCO NON-USER: CPT | Performed by: NURSE PRACTITIONER

## 2019-03-04 PROCEDURE — 99213 OFFICE O/P EST LOW 20 MIN: CPT | Performed by: NURSE PRACTITIONER

## 2019-03-04 RX ORDER — POLYMYXIN B SULFATE AND TRIMETHOPRIM 1; 10000 MG/ML; [USP'U]/ML
1 SOLUTION OPHTHALMIC EVERY 4 HOURS
Qty: 10 ML | Refills: 0 | Status: SHIPPED | OUTPATIENT
Start: 2019-03-04 | End: 2019-03-11

## 2019-03-04 NOTE — PROGRESS NOTES
Assessment/Plan:    No problem-specific Assessment & Plan notes found for this encounter  Diagnoses and all orders for this visit:    Bacterial conjunctivitis of left eye  -     polymyxin b-trimethoprim (POLYTRIM) ophthalmic solution; Administer 1 drop into the left eye every 4 (four) hours for 7 days      Patient advised to continue with warm compresses to left eye  Start antibiotic eye drops every 4 hours while awake  Wash pillowcases, avoid touching eyes, good hand hygiene, and monitor for symptoms in right eye  Continue antibiotic drops for 7 days  May start to use drops in right eye if symptoms begin in that eye  Reviewed red flag signs to call the office with or go to the Emergency Department with  Patient verbalizes understanding  Subjective:        Patient ID: Triston Marquez is a 33 y o  female  Patient presents today for an acute visit  She reports left eye drainage and lid pain since yesterday  Patient mentions that her symptoms started off as soreness and a a small stye that was not draining  She used warm compresses last night  In the morning the size of the stye increased and there was a lot of drainage through out the night  She states that there were several drops of purulent drops on her pillow  She was also unable to open her left eye this morning due to the discharge  She tried to warm compresses this morning to open her eyes  The eyes is still painful 6/10  She states that it is especially painful when she tries to blink and slight pain with EOM  She also notes some burning and sensitivity to light  Her symptoms have worsened over night  Also has slight blurred vision in left eye  She has not used any pain mediations  She notes that she does not wear contact lenses  She denies any fevers, chills, nausea, vomiting, constipation, chest tightness, SOB or palpitations         The following portions of the patient's history were reviewed and updated as appropriate: allergies, current medications, past family history, past medical history, past social history, past surgical history and problem list     Review of Systems   Constitutional: Negative for appetite change, chills, fatigue and fever  Eyes: Positive for photophobia, pain, discharge, redness and visual disturbance  Negative for itching  Respiratory: Negative for choking, chest tightness and shortness of breath  Cardiovascular: Negative for chest pain and palpitations  Gastrointestinal: Negative for abdominal pain  Musculoskeletal: Negative for arthralgias  Neurological: Negative for dizziness  Objective:      /78 (BP Location: Left arm, Patient Position: Sitting, Cuff Size: Large)   Pulse 82   Temp 97 8 °F (36 6 °C) (Oral)   Wt 114 kg (251 lb)   SpO2 99%   BMI 40 51 kg/m²          Physical Exam   Constitutional: She is oriented to person, place, and time  She appears well-developed and well-nourished  HENT:   Head: Normocephalic and atraumatic  Mouth/Throat: Oropharynx is clear and moist    Eyes: Pupils are equal, round, and reactive to light  EOM are normal  Lids are everted and swept, no foreign bodies found  Right eye exhibits no discharge and no hordeolum  Left eye exhibits discharge and hordeolum  No foreign body present in the left eye  Left conjunctiva is injected  No scleral icterus  Neck: Normal range of motion  Cardiovascular: Normal rate and regular rhythm  Pulmonary/Chest: Effort normal and breath sounds normal    Musculoskeletal: Normal range of motion  Neurological: She is alert and oriented to person, place, and time  Skin: Skin is warm and dry  Psychiatric: She has a normal mood and affect

## 2019-04-24 ENCOUNTER — HOSPITAL ENCOUNTER (OUTPATIENT)
Dept: RADIOLOGY | Facility: IMAGING CENTER | Age: 29
Discharge: HOME | End: 2019-04-24
Payer: COMMERCIAL

## 2019-04-24 DIAGNOSIS — R10.11 RUQ PAIN: ICD-10-CM

## 2019-04-24 PROCEDURE — 76705 ECHO EXAM OF ABDOMEN: CPT

## 2019-04-29 DIAGNOSIS — K80.20 CALCULUS OF GALLBLADDER WITHOUT CHOLECYSTITIS WITHOUT OBSTRUCTION: ICD-10-CM

## 2019-04-29 DIAGNOSIS — R10.11 RUQ PAIN: Primary | ICD-10-CM

## 2019-04-29 RX ORDER — DICYCLOMINE HCL 20 MG
20 TABLET ORAL 2 TIMES DAILY
Qty: 60 TABLET | Refills: 0 | Status: SHIPPED | OUTPATIENT
Start: 2019-04-29 | End: 2019-11-25 | Stop reason: ALTCHOICE

## 2019-04-29 RX ORDER — OMEPRAZOLE 40 MG/1
40 CAPSULE, DELAYED RELEASE ORAL DAILY
Qty: 30 CAPSULE | Refills: 1 | Status: SHIPPED | OUTPATIENT
Start: 2019-04-29 | End: 2019-05-20

## 2019-05-14 ENCOUNTER — CONSULT (OUTPATIENT)
Dept: SURGERY | Facility: CLINIC | Age: 29
End: 2019-05-14
Payer: COMMERCIAL

## 2019-05-14 VITALS
SYSTOLIC BLOOD PRESSURE: 114 MMHG | WEIGHT: 239.6 LBS | DIASTOLIC BLOOD PRESSURE: 78 MMHG | BODY MASS INDEX: 38.51 KG/M2 | HEIGHT: 66 IN | TEMPERATURE: 96.3 F

## 2019-05-14 DIAGNOSIS — R10.11 RUQ PAIN: ICD-10-CM

## 2019-05-14 DIAGNOSIS — K80.20 CALCULUS OF GALLBLADDER WITHOUT CHOLECYSTITIS WITHOUT OBSTRUCTION: ICD-10-CM

## 2019-05-14 PROCEDURE — 99244 OFF/OP CNSLTJ NEW/EST MOD 40: CPT | Performed by: SURGERY

## 2019-05-23 ENCOUNTER — ANESTHESIA EVENT (OUTPATIENT)
Dept: PERIOP | Facility: HOSPITAL | Age: 29
End: 2019-05-23
Payer: COMMERCIAL

## 2019-05-24 ENCOUNTER — ANESTHESIA (OUTPATIENT)
Dept: PERIOP | Facility: HOSPITAL | Age: 29
End: 2019-05-24
Payer: COMMERCIAL

## 2019-05-24 ENCOUNTER — APPOINTMENT (OUTPATIENT)
Dept: RADIOLOGY | Facility: HOSPITAL | Age: 29
End: 2019-05-24
Payer: COMMERCIAL

## 2019-05-24 ENCOUNTER — HOSPITAL ENCOUNTER (OUTPATIENT)
Facility: HOSPITAL | Age: 29
Setting detail: OUTPATIENT SURGERY
Discharge: HOME | End: 2019-05-24
Attending: SURGERY | Admitting: SURGERY
Payer: COMMERCIAL

## 2019-05-24 VITALS
OXYGEN SATURATION: 100 % | DIASTOLIC BLOOD PRESSURE: 70 MMHG | WEIGHT: 235 LBS | RESPIRATION RATE: 18 BRPM | SYSTOLIC BLOOD PRESSURE: 118 MMHG | TEMPERATURE: 98.4 F | HEIGHT: 66 IN | HEART RATE: 64 BPM | BODY MASS INDEX: 37.77 KG/M2

## 2019-05-24 DIAGNOSIS — K80.20 CALCULUS OF GALLBLADDER WITHOUT CHOLECYSTITIS WITHOUT OBSTRUCTION: ICD-10-CM

## 2019-05-24 LAB — EXT PREGNANCY TEST URINE: NEGATIVE

## 2019-05-24 PROCEDURE — 88304 TISSUE EXAM BY PATHOLOGIST: CPT | Performed by: PATHOLOGY

## 2019-05-24 PROCEDURE — 81025 URINE PREGNANCY TEST: CPT | Performed by: SURGERY

## 2019-05-24 PROCEDURE — 47562 LAPAROSCOPIC CHOLECYSTECTOMY: CPT | Performed by: SURGERY

## 2019-05-24 PROCEDURE — 47562 LAPAROSCOPIC CHOLECYSTECTOMY: CPT | Performed by: PHYSICIAN ASSISTANT

## 2019-05-24 RX ORDER — DEXAMETHASONE SODIUM PHOSPHATE 10 MG/ML
INJECTION, SOLUTION INTRAMUSCULAR; INTRAVENOUS AS NEEDED
Status: DISCONTINUED | OUTPATIENT
Start: 2019-05-24 | End: 2019-05-24 | Stop reason: SURG

## 2019-05-24 RX ORDER — HYDROMORPHONE HCL/PF 1 MG/ML
0.5 SYRINGE (ML) INJECTION
Status: COMPLETED | OUTPATIENT
Start: 2019-05-24 | End: 2019-05-24

## 2019-05-24 RX ORDER — LIDOCAINE HYDROCHLORIDE 10 MG/ML
INJECTION, SOLUTION INFILTRATION; PERINEURAL AS NEEDED
Status: DISCONTINUED | OUTPATIENT
Start: 2019-05-24 | End: 2019-05-24 | Stop reason: SURG

## 2019-05-24 RX ORDER — FENTANYL CITRATE/PF 50 MCG/ML
50 SYRINGE (ML) INJECTION
Status: DISCONTINUED | OUTPATIENT
Start: 2019-05-24 | End: 2019-05-24 | Stop reason: HOSPADM

## 2019-05-24 RX ORDER — ONDANSETRON 2 MG/ML
INJECTION INTRAMUSCULAR; INTRAVENOUS AS NEEDED
Status: DISCONTINUED | OUTPATIENT
Start: 2019-05-24 | End: 2019-05-24 | Stop reason: SURG

## 2019-05-24 RX ORDER — ONDANSETRON 2 MG/ML
4 INJECTION INTRAMUSCULAR; INTRAVENOUS ONCE
Status: DISCONTINUED | OUTPATIENT
Start: 2019-05-24 | End: 2019-05-24 | Stop reason: HOSPADM

## 2019-05-24 RX ORDER — SODIUM CHLORIDE, SODIUM LACTATE, POTASSIUM CHLORIDE, CALCIUM CHLORIDE 600; 310; 30; 20 MG/100ML; MG/100ML; MG/100ML; MG/100ML
125 INJECTION, SOLUTION INTRAVENOUS CONTINUOUS
Status: DISCONTINUED | OUTPATIENT
Start: 2019-05-24 | End: 2019-05-24 | Stop reason: HOSPADM

## 2019-05-24 RX ORDER — ROCURONIUM BROMIDE 10 MG/ML
INJECTION, SOLUTION INTRAVENOUS AS NEEDED
Status: DISCONTINUED | OUTPATIENT
Start: 2019-05-24 | End: 2019-05-24 | Stop reason: SURG

## 2019-05-24 RX ORDER — ALBUTEROL SULFATE 2.5 MG/3ML
2.5 SOLUTION RESPIRATORY (INHALATION) ONCE
Status: COMPLETED | OUTPATIENT
Start: 2019-05-24 | End: 2019-05-24

## 2019-05-24 RX ORDER — MIDAZOLAM HYDROCHLORIDE 1 MG/ML
INJECTION INTRAMUSCULAR; INTRAVENOUS AS NEEDED
Status: DISCONTINUED | OUTPATIENT
Start: 2019-05-24 | End: 2019-05-24 | Stop reason: SURG

## 2019-05-24 RX ORDER — NEOSTIGMINE METHYLSULFATE 1 MG/ML
INJECTION INTRAVENOUS AS NEEDED
Status: DISCONTINUED | OUTPATIENT
Start: 2019-05-24 | End: 2019-05-24 | Stop reason: SURG

## 2019-05-24 RX ORDER — CEFAZOLIN SODIUM 1 G/3ML
INJECTION, POWDER, FOR SOLUTION INTRAMUSCULAR; INTRAVENOUS AS NEEDED
Status: DISCONTINUED | OUTPATIENT
Start: 2019-05-24 | End: 2019-05-24 | Stop reason: SURG

## 2019-05-24 RX ORDER — KETAMINE HYDROCHLORIDE 100 MG/ML
INJECTION, SOLUTION INTRAMUSCULAR; INTRAVENOUS AS NEEDED
Status: DISCONTINUED | OUTPATIENT
Start: 2019-05-24 | End: 2019-05-24 | Stop reason: SURG

## 2019-05-24 RX ORDER — KETOROLAC TROMETHAMINE 30 MG/ML
INJECTION, SOLUTION INTRAMUSCULAR; INTRAVENOUS AS NEEDED
Status: DISCONTINUED | OUTPATIENT
Start: 2019-05-24 | End: 2019-05-24 | Stop reason: SURG

## 2019-05-24 RX ORDER — BUPIVACAINE HYDROCHLORIDE AND EPINEPHRINE 5; 5 MG/ML; UG/ML
INJECTION, SOLUTION PERINEURAL AS NEEDED
Status: DISCONTINUED | OUTPATIENT
Start: 2019-05-24 | End: 2019-05-24 | Stop reason: HOSPADM

## 2019-05-24 RX ORDER — HYDROMORPHONE HCL/PF 1 MG/ML
1 SYRINGE (ML) INJECTION EVERY 4 HOURS PRN
Status: DISCONTINUED | OUTPATIENT
Start: 2019-05-24 | End: 2019-05-24 | Stop reason: HOSPADM

## 2019-05-24 RX ORDER — IBUPROFEN 400 MG/1
400 TABLET ORAL EVERY 6 HOURS PRN
Status: DISCONTINUED | OUTPATIENT
Start: 2019-05-24 | End: 2019-05-24 | Stop reason: HOSPADM

## 2019-05-24 RX ORDER — PROPOFOL 10 MG/ML
INJECTION, EMULSION INTRAVENOUS AS NEEDED
Status: DISCONTINUED | OUTPATIENT
Start: 2019-05-24 | End: 2019-05-24 | Stop reason: SURG

## 2019-05-24 RX ORDER — PROPOFOL 10 MG/ML
INJECTION, EMULSION INTRAVENOUS CONTINUOUS PRN
Status: DISCONTINUED | OUTPATIENT
Start: 2019-05-24 | End: 2019-05-24 | Stop reason: SURG

## 2019-05-24 RX ORDER — ONDANSETRON 2 MG/ML
4 INJECTION INTRAMUSCULAR; INTRAVENOUS ONCE AS NEEDED
Status: DISCONTINUED | OUTPATIENT
Start: 2019-05-24 | End: 2019-05-24 | Stop reason: HOSPADM

## 2019-05-24 RX ORDER — GLYCOPYRROLATE 0.2 MG/ML
INJECTION INTRAMUSCULAR; INTRAVENOUS AS NEEDED
Status: DISCONTINUED | OUTPATIENT
Start: 2019-05-24 | End: 2019-05-24 | Stop reason: SURG

## 2019-05-24 RX ORDER — LIDOCAINE HYDROCHLORIDE 10 MG/ML
0.5 INJECTION, SOLUTION EPIDURAL; INFILTRATION; INTRACAUDAL; PERINEURAL ONCE AS NEEDED
Status: DISCONTINUED | OUTPATIENT
Start: 2019-05-24 | End: 2019-05-24 | Stop reason: HOSPADM

## 2019-05-24 RX ORDER — OXYCODONE HYDROCHLORIDE 5 MG/1
5 TABLET ORAL EVERY 4 HOURS PRN
Qty: 15 TABLET | Refills: 0 | Status: SHIPPED | OUTPATIENT
Start: 2019-05-24 | End: 2019-06-03

## 2019-05-24 RX ORDER — OXYCODONE HYDROCHLORIDE AND ACETAMINOPHEN 5; 325 MG/1; MG/1
1 TABLET ORAL EVERY 4 HOURS PRN
Status: DISCONTINUED | OUTPATIENT
Start: 2019-05-24 | End: 2019-05-24 | Stop reason: HOSPADM

## 2019-05-24 RX ORDER — FENTANYL CITRATE 50 UG/ML
INJECTION, SOLUTION INTRAMUSCULAR; INTRAVENOUS AS NEEDED
Status: DISCONTINUED | OUTPATIENT
Start: 2019-05-24 | End: 2019-05-24 | Stop reason: SURG

## 2019-05-24 RX ADMIN — SODIUM CHLORIDE, SODIUM LACTATE, POTASSIUM CHLORIDE, AND CALCIUM CHLORIDE: .6; .31; .03; .02 INJECTION, SOLUTION INTRAVENOUS at 08:41

## 2019-05-24 RX ADMIN — SODIUM CHLORIDE, SODIUM LACTATE, POTASSIUM CHLORIDE, AND CALCIUM CHLORIDE: .6; .31; .03; .02 INJECTION, SOLUTION INTRAVENOUS at 07:28

## 2019-05-24 RX ADMIN — ROCURONIUM BROMIDE 50 MG: 10 INJECTION, SOLUTION INTRAVENOUS at 07:36

## 2019-05-24 RX ADMIN — KETOROLAC TROMETHAMINE 30 MG: 30 INJECTION, SOLUTION INTRAMUSCULAR at 08:25

## 2019-05-24 RX ADMIN — FENTANYL CITRATE 50 MCG: 50 INJECTION, SOLUTION INTRAMUSCULAR; INTRAVENOUS at 09:24

## 2019-05-24 RX ADMIN — PROPOFOL 150 MG: 10 INJECTION, EMULSION INTRAVENOUS at 07:36

## 2019-05-24 RX ADMIN — FENTANYL CITRATE 50 MCG: 50 INJECTION, SOLUTION INTRAMUSCULAR; INTRAVENOUS at 07:36

## 2019-05-24 RX ADMIN — PROPOFOL 100 MCG/KG/MIN: 10 INJECTION, EMULSION INTRAVENOUS at 07:36

## 2019-05-24 RX ADMIN — ALBUTEROL SULFATE 2.5 MG: 2.5 SOLUTION RESPIRATORY (INHALATION) at 08:55

## 2019-05-24 RX ADMIN — GLYCOPYRROLATE 0.8 MG: 0.2 INJECTION, SOLUTION INTRAMUSCULAR; INTRAVENOUS at 08:25

## 2019-05-24 RX ADMIN — MIDAZOLAM 2 MG: 1 INJECTION INTRAMUSCULAR; INTRAVENOUS at 07:25

## 2019-05-24 RX ADMIN — KETAMINE HYDROCHLORIDE 50 MG: 100 INJECTION INTRAMUSCULAR; INTRAVENOUS at 07:36

## 2019-05-24 RX ADMIN — HYDROMORPHONE HYDROCHLORIDE 0.5 MG: 1 INJECTION, SOLUTION INTRAMUSCULAR; INTRAVENOUS; SUBCUTANEOUS at 09:50

## 2019-05-24 RX ADMIN — DEXAMETHASONE SODIUM PHOSPHATE 10 MG: 10 INJECTION, SOLUTION INTRAMUSCULAR; INTRAVENOUS at 07:45

## 2019-05-24 RX ADMIN — FENTANYL CITRATE 50 MCG: 50 INJECTION, SOLUTION INTRAMUSCULAR; INTRAVENOUS at 08:52

## 2019-05-24 RX ADMIN — LIDOCAINE HYDROCHLORIDE 10 ML: 10 INJECTION, SOLUTION INFILTRATION; PERINEURAL at 07:36

## 2019-05-24 RX ADMIN — CEFAZOLIN 2000 MG: 1 INJECTION, POWDER, FOR SOLUTION INTRAVENOUS at 07:30

## 2019-05-24 RX ADMIN — NEOSTIGMINE METHYLSULFATE 5 MG: 1 INJECTION, SOLUTION INTRAVENOUS at 08:25

## 2019-05-24 RX ADMIN — FENTANYL CITRATE 50 MCG: 50 INJECTION, SOLUTION INTRAMUSCULAR; INTRAVENOUS at 08:44

## 2019-05-24 RX ADMIN — FENTANYL CITRATE 50 MCG: 50 INJECTION, SOLUTION INTRAMUSCULAR; INTRAVENOUS at 07:44

## 2019-05-24 RX ADMIN — FENTANYL CITRATE 50 MCG: 50 INJECTION, SOLUTION INTRAMUSCULAR; INTRAVENOUS at 08:47

## 2019-05-24 RX ADMIN — ONDANSETRON 4 MG: 2 INJECTION INTRAMUSCULAR; INTRAVENOUS at 08:25

## 2019-06-05 ENCOUNTER — OFFICE VISIT (OUTPATIENT)
Dept: SURGERY | Facility: CLINIC | Age: 29
End: 2019-06-05

## 2019-06-05 VITALS
SYSTOLIC BLOOD PRESSURE: 128 MMHG | HEART RATE: 94 BPM | TEMPERATURE: 98 F | HEIGHT: 66 IN | BODY MASS INDEX: 37.61 KG/M2 | DIASTOLIC BLOOD PRESSURE: 70 MMHG | WEIGHT: 234 LBS

## 2019-06-05 DIAGNOSIS — Z90.49 S/P LAPAROSCOPIC CHOLECYSTECTOMY: Primary | ICD-10-CM

## 2019-06-05 PROBLEM — K80.20 CALCULUS OF GALLBLADDER WITHOUT CHOLECYSTITIS WITHOUT OBSTRUCTION: Status: RESOLVED | Noted: 2019-05-14 | Resolved: 2019-06-05

## 2019-06-05 PROCEDURE — 99024 POSTOP FOLLOW-UP VISIT: CPT | Performed by: SURGERY

## 2019-08-19 ENCOUNTER — OFFICE VISIT (OUTPATIENT)
Dept: OBGYN CLINIC | Facility: OTHER | Age: 29
End: 2019-08-19
Payer: COMMERCIAL

## 2019-08-19 ENCOUNTER — APPOINTMENT (OUTPATIENT)
Dept: RADIOLOGY | Facility: OTHER | Age: 29
End: 2019-08-19
Payer: COMMERCIAL

## 2019-08-19 VITALS
BODY MASS INDEX: 37.51 KG/M2 | WEIGHT: 233.4 LBS | DIASTOLIC BLOOD PRESSURE: 80 MMHG | HEART RATE: 75 BPM | SYSTOLIC BLOOD PRESSURE: 129 MMHG | HEIGHT: 66 IN

## 2019-08-19 DIAGNOSIS — M25.562 ACUTE PAIN OF LEFT KNEE: ICD-10-CM

## 2019-08-19 DIAGNOSIS — M25.562 ACUTE PAIN OF LEFT KNEE: Primary | ICD-10-CM

## 2019-08-19 PROCEDURE — 99203 OFFICE O/P NEW LOW 30 MIN: CPT | Performed by: ORTHOPAEDIC SURGERY

## 2019-08-19 PROCEDURE — 73562 X-RAY EXAM OF KNEE 3: CPT

## 2019-08-19 RX ORDER — NAPROXEN 500 MG/1
500 TABLET ORAL 2 TIMES DAILY WITH MEALS
Qty: 60 TABLET | Refills: 2 | Status: SHIPPED | OUTPATIENT
Start: 2019-08-19 | End: 2020-04-01 | Stop reason: ALTCHOICE

## 2019-08-19 NOTE — PATIENT INSTRUCTIONS
Plan :  1  I explained the natural history of the  problem to the  patient -I believe this is a muscle strain or overuse syndrome, not a true internal derangement of the knee  There is no surgical intervention necessary now and should improve with nonoperative treatment  2  Activity modification - avoid hyperflexion like bending, kneeling, squatting,  stairs as  much as humanly possible  Change position frequently to straighten your leg if you are sitting for a long period of time  3  Pain should be the warning guide to your activities - both during and after exercises  Stop doing your sport or activity if you are getting significant pain  4  Ice in large trash bag or bag of frozen peas for 15 min 4x a day, if needed, for pain or swelling  Heat is not indicated  5  Take Naprosyn 500 mg 1 tablet twice a day after meals every day to decrease pain and inflammation  Stop this medicine if you develop any heartburn, nausea, vomiting, or diarrhea  6  Do the isometric quad and hamstring exercises that you were shown diligently at  home  7  Gliding activities (skiing machine, elliptical, swimming) are allowed, but do not do pounding activities  ( treadmill, aerobics, distance walking )

## 2019-08-19 NOTE — PROGRESS NOTES
Chief Complaint   Patient presents with    Left Knee - Pain           Assessment:  Left knee pain    Plan :  1  I explained the natural history of the  problem to the  patient -I believe this is a muscle strain or overuse syndrome, not a true internal derangement of the knee  There is no surgical intervention necessary now and should improve with nonoperative treatment  2  Activity modification - avoid hyperflexion like bending, kneeling, squatting,  stairs as  much as humanly possible  Change position frequently to straighten your leg if you are sitting for a long period of time  3  Pain should be the warning guide to your activities - both during and after exercises  Stop doing your sport or activity if you are getting significant pain  4  Ice in large trash bag or bag of frozen peas for 15 min 4x a day, if needed, for pain or swelling  Heat is not indicated  5  Take Naprosyn 500 mg 1 tablet twice a day after meals every day to decrease pain and inflammation  Stop this medicine if you develop any heartburn, nausea, vomiting, or diarrhea  6  Do the isometric quad and hamstring exercises that you were shown diligently at  home  7  Gliding activities (skiing machine, elliptical, swimming) are allowed, but do not do pounding activities  ( treadmill, aerobics, distance walking )  HPI:   This is a 26-year-old white female presenting today for orthopedic evaluation of her left knee  She is self-referred  She notes that she has been having pain without any obvious injury or trauma since February 2019  She denies any previous issues with this knee  She also mentions on one occasion she was rising from a stool and felt a pop in her knee  She denies any swelling or bruising  She denies pain with steps or squatting  Her pain is mostly exacerbated with prolonged walking or twisting maneuvers  She denies any locking or catching sensations  She has not had any treatments to date    She localizes her pain to the anteromedial and posterior knee  PMHx:         Past Medical History:   Diagnosis Date    Asthma     childhood    Contact dermatitis     Last Assessed [id number]  Resolved [id number]   Epidermal inclusion cyst     Last Assessed [id number]  Resolved [id number]      Hearing loss     Migraine     Skin disorder        Past Surgical History:   Procedure Laterality Date    ADENOIDECTOMY      NM LAP,CHOLECYSTECTOMY N/A 5/24/2019    Procedure: CHOLECYSTECTOMY LAPAROSCOPIC;  Surgeon: Carlos Zheng MD;  Location: [facility];  Service: General    TOOTH EXTRACTION  2008       Family History   Problem Relation Age of Onset    Depression Mother     Diabetes Father     Hiatal hernia Father     Hypertension Father     Other Paternal Grandmother         Bowel disease    Colon cancer Other        Social History     Socioeconomic History    Marital status: Single     Spouse name: Not on file    Number of children: Not on file    Years of education: Not on file    Highest education level: Not on file   Occupational History    Not on file   Social Needs    Financial resource strain: Not on file    Food insecurity:     Worry: Not on file     Inability: Not on file    Transportation needs:     Medical: Not on file     Non-medical: Not on file   Tobacco Use    Smoking status: Former Smoker     Packs/day: 2 00     Years: 13 00     Pack years: 26 00     Types: Cigarettes     Last attempt to quit: 8/23/2015     Years since quitting: 3 9    Smokeless tobacco: Never Used   Substance and Sexual Activity    Alcohol use: Yes     Frequency: 2-4 times a month    Drug use: No    Sexual activity: Not on file   Lifestyle    Physical activity:     Days per week: Not on file     Minutes per session: Not on file    Stress: Not on file   Relationships    Social connections:     Talks on phone: Not on file     Gets together: Not on file     Attends Jainism service: Not on file     Active member of club or organization: Not on file     Attends meetings of clubs or organizations: Not on file     Relationship status: Not on file    Intimate partner violence:     Fear of current or ex partner: Not on file     Emotionally abused: Not on file     Physically abused: Not on file     Forced sexual activity: Not on file   Other Topics Concern    Not on file   Social History Narrative    Daily caffein consumption, 2-3 servings/day       Current Outpatient Medications   Medication Sig Dispense Refill    clindamycin (CLEOCIN T) 1 % lotion APPLY SPARINGLY TO AFFECTED AREA EVERY DAY, prn  3    dicyclomine (BENTYL) 20 mg tablet Take 1 tablet (20 mg total) by mouth 2 (two) times a day (Patient not taking: Reported on 6/5/2019) 60 tablet 0     No current facility-administered medications for this visit  Allergies: Biaxin [clarithromycin]; Ciprofloxacin; Sulfa antibiotics; and Zithromax [azithromycin]    ROS:  Positive for orthopedic complaints noted above  The remaining 11/12 systems on the intake sheet that I reviewed were negative  PE:  /80   Pulse 75   Ht 5' 6" (1 676 m)   Wt 106 kg (233 lb 6 4 oz)   BMI 37 67 kg/m²   Constitutional: The patient was  oriented to person, place, and time  She was heavy-set  In no acute distress  HEENT: Vision intact  Hearing normal  Swallowing normal   Head: Normocephalic  Cardiovascular: Intact distal pulses  Pulse regular  Pulmonary/Chest: Effort normal  No respiratory distress  Neurological: Alert and oriented to person, place, and time  Skin: Skin is warm  Psychiatric: Normal mood and affect  Ortho Exam:  On today's exam of the left knee, she was not limping or using any external walking aid  There is no swelling, redness, ecchymosis, or signs of infection  The skin is warm dry and well perfused  She was minimally tender to palpation at the medial joint line, as well as the lateral joint line  She was not tender around the patella    The patellar compression test was negative  Range of motion of the right knee was from 5° of hyperextension to 120° of flexion, compared to the involved left knee which goes from 5° of hyperextension to 125° of flexion  She demonstrated no ligamentous laxity on AP or mediolateral stress testing comparing left to right knees     She has full strength in both knees  Positive Ivan's left knee  The patella tracks centrally without crepitus  She normal sensation to light touch with brisk capillary refill of all toes  2+ DP and PT pulses  There is no calf tenderness, popliteal adenopathy or cellulitis noted  Studies reviewed:  I personally reviewed left knee x-rays and these showed no fracture, dislocation, or any degenerative change  There are no lytic areas  There is no soft tissue calcification seen    She had no loose bodies noted    Scribe Attestation    I,:   Gilbert Medrano MA am acting as a scribe while in the presence of the attending physician :        I,:   Susan Perrin MD personally performed the services described in this documentation    as scribed in my presence :

## 2019-08-19 NOTE — LETTER
August 19, 2019     Kenneth Dongck, [address] [address]    Patient: Anne England   YOB: 1990   Date of Visit: 8/19/2019       Dear Ms Alessio Purvis:    Thank you for referring Lore Titus to me for evaluation  Below are my notes for this consultation  If you have questions, please do not hesitate to call me  I look forward to following your patient along with you  Sincerely,        Elisa Robertson MD        CC: No Recipients  Elisa Robertson MD  8/19/2019 12:13 PM  Sign at close encounter  Chief Complaint   Patient presents with    Left Knee - Pain           Assessment:  Left knee pain    Plan :  1  I explained the natural history of the  problem to the  patient -I believe this is a muscle strain or overuse syndrome, not a true internal derangement of the knee  There is no surgical intervention necessary now and should improve with nonoperative treatment  2  Activity modification - avoid hyperflexion like bending, kneeling, squatting,  stairs as  much as humanly possible  Change position frequently to straighten your leg if you are sitting for a long period of time  3  Pain should be the warning guide to your activities - both during and after exercises  Stop doing your sport or activity if you are getting significant pain  4  Ice in large trash bag or bag of frozen peas for 15 min 4x a day, if needed, for pain or swelling  Heat is not indicated  5  Take Naprosyn 500 mg 1 tablet twice a day after meals every day to decrease pain and inflammation  Stop this medicine if you develop any heartburn, nausea, vomiting, or diarrhea  6  Do the isometric quad and hamstring exercises that you were shown diligently at  home  7  Gliding activities (skiing machine, elliptical, swimming) are allowed, but do not do pounding activities  ( treadmill, aerobics, distance walking )         HPI:   This is a 42-year-old white female presenting today for orthopedic evaluation of her left knee  She is self-referred  She notes that she has been having pain without any obvious injury or trauma since February 2019  She denies any previous issues with this knee  She also mentions on one occasion she was rising from a stool and felt a pop in her knee  She denies any swelling or bruising  She denies pain with steps or squatting  Her pain is mostly exacerbated with prolonged walking or twisting maneuvers  She denies any locking or catching sensations  She has not had any treatments to date  She localizes her pain to the anteromedial and posterior knee  PMHx:         Past Medical History:   Diagnosis Date    Asthma     childhood    Contact dermatitis     Last Assessed [id number]  Resolved [id number]   Epidermal inclusion cyst     Last Assessed [id number]  Resolved [id number]      Hearing loss     Migraine     Skin disorder        Past Surgical History:   Procedure Laterality Date    ADENOIDECTOMY      [place] LAP,CHOLECYSTECTOMY N/A 5/24/2019    Procedure: CHOLECYSTECTOMY LAPAROSCOPIC;  Surgeon: David Mello MD;  [facility]: [facility];  Service: General    TOOTH EXTRACTION  2008       Family History   Problem Relation Age of Onset    Depression Mother     Diabetes Father     Hiatal hernia Father     Hypertension Father     Other Paternal Grandmother         Bowel disease    Colon cancer Other        Social History     Socioeconomic History    Marital status: Single     Spouse name: Not on file    Number of children: Not on file    Years of education: Not on file    Highest education level: Not on file   Occupational History    Not on file   Social Needs    Financial resource strain: Not on file    Food insecurity:     Worry: Not on file     Inability: Not on file    Transportation needs:     Medical: Not on file     Non-medical: Not on file   Tobacco Use    Smoking status: Former Smoker     Packs/day: 2 00     Years: 13 00     Pack years: 26 00 Types: Cigarettes     Last attempt to quit: 8/23/2015     Years since quitting: 3 9    Smokeless tobacco: Never Used   Substance and Sexual Activity    Alcohol use: Yes     Frequency: 2-4 times a month    Drug use: No    Sexual activity: Not on file   Lifestyle    Physical activity:     Days per week: Not on file     Minutes per session: Not on file    Stress: Not on file   Relationships    Social connections:     Talks on phone: Not on file     Gets together: Not on file     Attends [employer]ism service: Not on file     Active member of club or organization: Not on file     Attends meetings of clubs or organizations: Not on file     Relationship status: Not on file    Intimate partner violence:     Fear of current or ex partner: Not on file     Emotionally abused: Not on file     Physically abused: Not on file     Forced sexual activity: Not on file   Other Topics Concern    Not on file   Social History Narrative    Daily caffein consumption, 2-3 servings/day       Current Outpatient Medications   Medication Sig Dispense Refill    clindamycin (CLEOCIN T) 1 % lotion APPLY SPARINGLY TO AFFECTED AREA EVERY DAY, prn  3    dicyclomine (BENTYL) 20 mg tablet Take 1 tablet (20 mg total) by mouth 2 (two) times a day (Patient not taking: Reported on 6/5/2019) 60 tablet 0     No current facility-administered medications for this visit  Allergies: Biaxin [clarithromycin]; Ciprofloxacin; Sulfa antibiotics; and Zithromax [azithromycin]    ROS:  Positive for orthopedic complaints noted above  The remaining 11/12 systems on the intake sheet that I reviewed were negative  PE:  /80   Pulse 75   Ht 5' 6" (1 676 m)   Wt 106 kg (233 lb 6 4 oz)   BMI 37 67 kg/m²    Constitutional: The patient was  oriented to person, place, and time  She was heavy-set  In no acute distress  HEENT: Vision intact  Hearing normal  Swallowing normal   Head: Normocephalic  Cardiovascular: Intact distal pulses   Pulse regular  Pulmonary/Chest: Effort normal  No respiratory distress  Neurological: Alert and oriented to person, place, and time  Skin: Skin is warm  Psychiatric: Normal mood and affect  Ortho Exam:  On today's exam of the left knee, she was not limping or using any external walking aid  There is no swelling, redness, ecchymosis, or signs of infection  The skin is warm dry and well perfused  She was minimally tender to palpation at the medial joint line, as well as the lateral joint line  She was not tender around the patella  The patellar compression test was negative  Range of motion of the right knee was from 5° of hyperextension to 120° of flexion, compared to the involved left knee which goes from 5° of hyperextension to 125° of flexion  She demonstrated no ligamentous laxity on AP or mediolateral stress testing comparing left to right knees     She has full strength in both knees  Positive Ivan's left knee  The patella tracks centrally without crepitus  She normal sensation to light touch with brisk capillary refill of all toes  2+ DP and PT pulses  There is no calf tenderness, popliteal adenopathy or cellulitis noted  Studies reviewed:  I personally reviewed left knee x-rays and these showed no fracture, dislocation, or any degenerative change  There are no lytic areas  There is no soft tissue calcification seen    She had no loose bodies noted    Scribe Attestation    I,:   Monica Jimenez MA am acting as a scribe while in the presence of the attending physician :        I,:   Ricardo Wilson MD personally performed the services described in this documentation    as scribed in my presence :

## 2019-08-27 ENCOUNTER — TELEPHONE (OUTPATIENT)
Dept: INTERNAL MEDICINE CLINIC | Age: 29
End: 2019-08-27

## 2019-08-27 NOTE — TELEPHONE ENCOUNTER
Received consult note and reviewed chart  Pt has only been seen for acute concerns  Due for physical - has outstanding HM items  Please have her schedule a physical with myself  THANKS!

## 2019-11-25 ENCOUNTER — OFFICE VISIT (OUTPATIENT)
Dept: INTERNAL MEDICINE CLINIC | Facility: CLINIC | Age: 29
End: 2019-11-25
Payer: COMMERCIAL

## 2019-11-25 VITALS
OXYGEN SATURATION: 98 % | HEIGHT: 67 IN | BODY MASS INDEX: 36.85 KG/M2 | TEMPERATURE: 98.3 F | HEART RATE: 77 BPM | SYSTOLIC BLOOD PRESSURE: 110 MMHG | DIASTOLIC BLOOD PRESSURE: 60 MMHG | WEIGHT: 234.8 LBS

## 2019-11-25 DIAGNOSIS — R53.83 OTHER FATIGUE: ICD-10-CM

## 2019-11-25 DIAGNOSIS — F41.9 ANXIETY: ICD-10-CM

## 2019-11-25 DIAGNOSIS — Z86.59 HISTORY OF BIPOLAR DISORDER: ICD-10-CM

## 2019-11-25 DIAGNOSIS — G47.26 SHIFT WORK SLEEP DISORDER: ICD-10-CM

## 2019-11-25 DIAGNOSIS — G47.9 SLEEP DIFFICULTIES: Primary | ICD-10-CM

## 2019-11-25 DIAGNOSIS — E66.09 CLASS 2 OBESITY DUE TO EXCESS CALORIES WITHOUT SERIOUS COMORBIDITY WITH BODY MASS INDEX (BMI) OF 36.0 TO 36.9 IN ADULT: ICD-10-CM

## 2019-11-25 PROBLEM — M25.562 ACUTE PAIN OF LEFT KNEE: Status: RESOLVED | Noted: 2019-08-19 | Resolved: 2019-11-25

## 2019-11-25 PROBLEM — E66.812 CLASS 2 OBESITY DUE TO EXCESS CALORIES WITHOUT SERIOUS COMORBIDITY IN ADULT: Status: ACTIVE | Noted: 2019-02-13

## 2019-11-25 PROBLEM — Z90.49 S/P LAPAROSCOPIC CHOLECYSTECTOMY: Status: RESOLVED | Noted: 2019-06-05 | Resolved: 2019-11-25

## 2019-11-25 PROBLEM — K59.00 CONSTIPATION: Status: RESOLVED | Noted: 2019-02-13 | Resolved: 2019-11-25

## 2019-11-25 PROBLEM — J01.10 ACUTE NON-RECURRENT FRONTAL SINUSITIS: Status: RESOLVED | Noted: 2018-06-21 | Resolved: 2019-11-25

## 2019-11-25 PROBLEM — R10.11 RUQ PAIN: Status: RESOLVED | Noted: 2019-02-13 | Resolved: 2019-11-25

## 2019-11-25 PROCEDURE — 99214 OFFICE O/P EST MOD 30 MIN: CPT | Performed by: NURSE PRACTITIONER

## 2019-11-25 NOTE — ASSESSMENT & PLAN NOTE
- Patient currently works shift work and reports that she has been having difficulty sleeping for the past one year  She works as a  working night shift from 9 p m  To approximately 10 a m  As a  Monday through Friday  Then she goes to normal schedule Saturday and Sunday and will transition back and forth  She reports that she has extreme difficulties falling asleep at night especially during the transition from the day shift to night shift   -  Patient has not been attempting any over-the-counter medications  Discussed with patient to try over-the-counter melatonin 2 5 milligrams daily  Discussed with patient good sleep hygiene options  Additionally discussed with patient the importance of her taking naps when transitioning from day/night sleep  - limited caffeine use and > 6 hours prior to sleeping   - pt with significant daytime fatigue to the point that she is feels that she can fall asleep when driving  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

## 2019-11-25 NOTE — PROGRESS NOTES
Assessment/Plan:    Pt presents for follow-up for sleep concerns  See details below    Follow-up in 1 month, sooner if needed    Problem List Items Addressed This Visit        Other    Class 2 obesity due to excess calories without serious comorbidity in adult    Sleep difficulties - Primary     - Patient currently works shift work and reports that she has been having difficulty sleeping for the past one year  She works as a  working night shift from 9 p m  To approximately 10 a m  As a  Monday through Friday  Then she goes to normal schedule Saturday and Sunday and will transition back and forth  She reports that she has extreme difficulties falling asleep at night especially during the transition from the day shift to night shift   -  Patient has not been attempting any over-the-counter medications  Discussed with patient to try over-the-counter melatonin 2 5 milligrams daily  Discussed with patient good sleep hygiene options  Additionally discussed with patient the importance of her taking naps when transitioning from day/night sleep  - limited caffeine use and > 6 hours prior to sleeping   - pt with significant daytime fatigue to the point that she is feels that she can fall asleep when driving  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, 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 to r/o FLORES that may be contributing  Risk factor:  Obesity  - will check labs  - follow-up in 1 month, sooner if needed         Relevant Orders    CBC and differential    Comprehensive metabolic panel    Home Study    Other fatigue    Relevant Orders    Home Study    Anxiety    Relevant Orders    TSH, 3rd generation with Free T4 reflex    Home Study    Ambulatory referral to Psychiatry    History of bipolar disorder    Relevant Orders    Ambulatory referral to Psychiatry    Shift work sleep disorder    Relevant Orders    Ambulatory referral to Psychiatry        Health Maintenance Items:  - BMI Counseling:  Body mass index is 36 64 kg/m²  The BMI is above normal  Nutrition recommendations include decreasing portion sizes, encouraging healthy choices of fruits and vegetables, consuming healthier snacks, moderation in carbohydrate intake and increasing intake of lean protein  Exercise recommendations include exercising 3-5 times per week  No pharmacotherapy was ordered  M*Modal software was used to dictate this note  It may contain errors with dictating incorrect words or incorrect spelling  Please contact the provider directly with any questions  Subjective:      Patient ID: Radha Marquez is a 26 y o  female  Pt is a 26 y o  Female who presents to the office with difficulty sleeping x1 year and worsening anxiety in the past 3 months  Pt reports working night shift (9:45 PM to around 10:00 AM) as a   Monday through Friday  She normally sleeps 4-6 hours on week nights, then on weekends she will return to sleeping during the night; after finishing her shift on Friday night through Saturday morning, she will not sleep until Saturday night, leaving about 30 hours since she last slept (on Friday morning)  She is able to sleep 8-9 hours per night on Saturday and Sunday  Pt notes feeling fatigued during her waking hours, and reports 10-minute periods in which she will suddenly fall asleep while talking with someone or feel like she could fall asleep behind the wheel  She denies taking any OTC or prescribed medications for her insomnia  States she has heavy curtains over the windows in her bedroom, however some light is still able to enter the room  Pt also notes worsening of her anxiety symptoms in the past 3 months including increased irritability, excessive worry and obsessions about leaving the house unlocked  She notes 3-4 instances during the past 3 months in which she was late to work due to driving back home to make sure the door and gate were locked   She reports 2 panic attacks in the past month with mainly chest tightness, states they last 20 minutes each time  Pt reports seeing a psychiatrist as a child and was previously treated for ADHD and bipolar disorder, however since then she has not received any psychiatric evaluation or treatment in the past 12 years  Pt currently denies any depressed mood, no changes in appetite, no SI, no HI, no self-injury per pt  She denies periods of increased impulsivity or high energy, denies going days without sleep  Anxiety   Presents for follow-up visit  Symptoms include decreased concentration (chronic, unchanged from baseline), excessive worry, insomnia, irritability, nervous/anxious behavior, obsessions and panic  Patient reports no chest pain, confusion, depressed mood, dizziness, nausea, palpitations, shortness of breath or suicidal ideas  Insomnia  Patient complains of insomnia  Onset was 1 year ago  Patient describes symptoms as early morning awakening, difficulty falling asleep and non-restful sleep  Pt has not tried OTC melatonin or benadryl  Associated symptoms include: anxiety, daytime somnolence, fatigue and irritability  Patient denies frequent nighttime urination, leg cramps, restless legs and snoring  Symptoms have gradually worsened  The following portions of the patient's history were reviewed and updated as appropriate: allergies, current medications, past family history, past medical history, past social history, past surgical history and problem list     Review of Systems   Constitutional: Positive for fatigue and irritability  Negative for appetite change, chills and fever  HENT: Negative for congestion, postnasal drip, sinus pressure and sinus pain  Eyes: Negative for visual disturbance  Respiratory: Positive for chest tightness  Negative for cough, shortness of breath and wheezing  Cardiovascular: Negative for chest pain, palpitations and leg swelling     Gastrointestinal: Negative for abdominal pain, diarrhea, nausea and vomiting  Neurological: Positive for headaches (unchanged from baseline, occurs weekly and resolves with Aleve)  Negative for dizziness, tremors, seizures, syncope, weakness and numbness  Psychiatric/Behavioral: Positive for decreased concentration (chronic, unchanged from baseline) and sleep disturbance  Negative for agitation, behavioral problems, confusion, dysphoric mood, self-injury and suicidal ideas  The patient is nervous/anxious and has insomnia  Past Medical History:   Diagnosis Date    Allergic rhinitis     Asthma     childhood    Contact dermatitis     Last Assessed [id number]  Resolved [id number]   Epidermal inclusion cyst     Last Assessed [id number]  Resolved [id number]      Hearing loss     Migraine     Skin disorder          Current Outpatient Medications:     clindamycin (CLEOCIN T) 1 % lotion, APPLY SPARINGLY TO AFFECTED AREA EVERY DAY, prn, Disp: , Rfl: 3    naproxen (NAPROSYN) 500 mg tablet, Take 1 tablet (500 mg total) by mouth 2 (two) times a day with meals, Disp: 60 tablet, Rfl: 2    Allergies   Allergen Reactions    Biaxin [Clarithromycin] Vomiting    Ciprofloxacin Hives    Sulfa Antibiotics Hives    Zithromax [Azithromycin] Vomiting       Social History   Past Surgical History:   Procedure Laterality Date    ADENOIDECTOMY      CHOLECYSTECTOMY  05/25/2019    [place] LAP,CHOLECYSTECTOMY N/A 5/24/2019    Procedure: CHOLECYSTECTOMY LAPAROSCOPIC;  Surgeon: Ricardo Preston MD;  Location: [facility];  Service: General    TOOTH EXTRACTION  2008     Family History   Problem Relation Age of Onset    Depression Mother     Diabetes Father     Hiatal hernia Father     Hypertension Father     Other Paternal Grandmother         Bowel disease    Colon cancer Other        Objective:  /60 (BP Location: Left arm, Patient Position: Sitting, Cuff Size: Large)   Pulse 77   Temp 98 3 °F (36 8 °C) (Oral)   Ht 5' 7 13" (1 705 m) Comment: with shoes  Wt 107 kg (234 lb 12 8 oz) Comment: with shoes  SpO2 98% Comment: room air  BMI 36 64 kg/m²      Physical Exam   Constitutional: She is oriented to person, place, and time  She appears well-developed and well-nourished  No distress  Appears tired   Neck: No thyromegaly present  Cardiovascular: Normal rate, regular rhythm and normal heart sounds  No murmur heard  Pulmonary/Chest: Effort normal and breath sounds normal  No respiratory distress  She has no wheezes  Neurological: She is alert and oriented to person, place, and time  No cranial nerve deficit  No focal deficits   Skin: Skin is warm and dry  Psychiatric: She has a normal mood and affect  Her behavior is normal  Judgment and thought content normal  Her mood appears not anxious  Her affect is not angry and not inappropriate  Her speech is not rapid and/or pressured, not delayed, not tangential and not slurred  She is not agitated, not hyperactive, not slowed and not withdrawn  Cognition and memory are normal  She does not exhibit a depressed mood  She is communicative  She is attentive  Nursing note and vitals reviewed

## 2019-11-25 NOTE — PATIENT INSTRUCTIONS
Will check basic labs    Recommend good sleep hygiene:  · It is difficult for you to regulate her sleep cycle given you your night shift working  It is important that when you transition from her night shift working to routine weekends during the day that you do not sleep the whole day Friday night and only take a short nap  The same is important when you go in prior to your 1st night shift on Monday night that you take a nap Monday afternoon prior to going in  · Recommend OTC melatonin, start 3mg daily approx 30 mins prior to bed  Or can try benadryl  · Will check sleep study    Some habits that can improve your sleep health:  Be consistent  Go to bed at the same time each night and get up at the same time each morning, including on the weekends   Make sure your bedroom is quiet, dark, relaxing, and at a comfortable temperature   Consider a sound machine or a fan for white noise  Remove electronic devices, such as TVs, computers, and smart phones, from the bedroom   Avoid large meals, caffeine, and alcohol before bedtime   Get some exercise  Being physically active during the day can help you fall asleep more easily at night

## 2020-04-01 ENCOUNTER — TELEPHONE (OUTPATIENT)
Dept: OTHER | Facility: OTHER | Age: 30
End: 2020-04-01

## 2020-04-01 ENCOUNTER — TELEMEDICINE (OUTPATIENT)
Dept: INTERNAL MEDICINE CLINIC | Facility: CLINIC | Age: 30
End: 2020-04-01
Payer: COMMERCIAL

## 2020-04-01 ENCOUNTER — TELEPHONE (OUTPATIENT)
Dept: INTERNAL MEDICINE CLINIC | Facility: CLINIC | Age: 30
End: 2020-04-01

## 2020-04-01 DIAGNOSIS — Z20.828 EXPOSURE TO SARS-ASSOCIATED CORONAVIRUS: Primary | ICD-10-CM

## 2020-04-01 DIAGNOSIS — Z20.828 EXPOSURE TO SARS-ASSOCIATED CORONAVIRUS: ICD-10-CM

## 2020-04-01 PROCEDURE — 99213 OFFICE O/P EST LOW 20 MIN: CPT | Performed by: NURSE PRACTITIONER

## 2020-04-01 PROCEDURE — 87635 SARS-COV-2 COVID-19 AMP PRB: CPT

## 2020-04-01 RX ORDER — ALPRAZOLAM 0.25 MG/1
0.25 TABLET ORAL 3 TIMES DAILY PRN
COMMUNITY
Start: 2020-03-02

## 2020-04-01 RX ORDER — LEVOFLOXACIN 750 MG/1
TABLET ORAL
COMMUNITY
Start: 2020-03-27 | End: 2021-12-30 | Stop reason: ALTCHOICE

## 2020-04-01 RX ORDER — BETAMETHASONE DIPROPIONATE 0.5 MG/G
1 CREAM TOPICAL 2 TIMES DAILY PRN
COMMUNITY
Start: 2020-03-02

## 2020-04-01 RX ORDER — FLUOXETINE HYDROCHLORIDE 40 MG/1
40 CAPSULE ORAL DAILY
COMMUNITY
Start: 2020-01-06

## 2020-04-05 LAB — SARS-COV-2 RNA SPEC QL NAA+PROBE: NOT DETECTED

## 2020-04-07 ENCOUNTER — TELEPHONE (OUTPATIENT)
Dept: INTERNAL MEDICINE CLINIC | Facility: CLINIC | Age: 30
End: 2020-04-07

## 2020-11-06 ENCOUNTER — TELEPHONE (OUTPATIENT)
Dept: INTERNAL MEDICINE CLINIC | Facility: CLINIC | Age: 30
End: 2020-11-06

## 2021-06-29 ENCOUNTER — OFFICE VISIT (OUTPATIENT)
Dept: OBGYN CLINIC | Facility: CLINIC | Age: 31
End: 2021-06-29
Payer: COMMERCIAL

## 2021-06-29 VITALS
BODY MASS INDEX: 39.7 KG/M2 | WEIGHT: 247 LBS | DIASTOLIC BLOOD PRESSURE: 70 MMHG | HEIGHT: 66 IN | SYSTOLIC BLOOD PRESSURE: 122 MMHG

## 2021-06-29 DIAGNOSIS — B37.49 GENITAL CANDIDIASIS: ICD-10-CM

## 2021-06-29 DIAGNOSIS — R39.15 URINARY URGENCY: ICD-10-CM

## 2021-06-29 DIAGNOSIS — N89.8 VAGINAL ODOR: ICD-10-CM

## 2021-06-29 DIAGNOSIS — Z11.3 SCREENING FOR STDS (SEXUALLY TRANSMITTED DISEASES): ICD-10-CM

## 2021-06-29 DIAGNOSIS — N72 CERVICITIS: ICD-10-CM

## 2021-06-29 DIAGNOSIS — N89.8 VAGINAL DISCHARGE: Primary | ICD-10-CM

## 2021-06-29 DIAGNOSIS — N76.0 ACUTE VAGINITIS: ICD-10-CM

## 2021-06-29 LAB
SL AMB  POCT GLUCOSE, UA: NEGATIVE
SL AMB LEUKOCYTE ESTERASE,UA: NEGATIVE
SL AMB POCT BILIRUBIN,UA: NEGATIVE
SL AMB POCT BLOOD,UA: NEGATIVE
SL AMB POCT CLARITY,UA: CLEAR
SL AMB POCT COLOR,UA: YELLOW
SL AMB POCT KETONES,UA: NEGATIVE
SL AMB POCT NITRITE,UA: NEGATIVE
SL AMB POCT PH,UA: 5
SL AMB POCT SPECIFIC GRAVITY,UA: 1000
SL AMB POCT URINE PROTEIN: NEGATIVE
SL AMB POCT UROBILINOGEN: NORMAL

## 2021-06-29 PROCEDURE — 99203 OFFICE O/P NEW LOW 30 MIN: CPT | Performed by: PHYSICIAN ASSISTANT

## 2021-06-29 PROCEDURE — 81002 URINALYSIS NONAUTO W/O SCOPE: CPT | Performed by: PHYSICIAN ASSISTANT

## 2021-06-29 PROCEDURE — 3008F BODY MASS INDEX DOCD: CPT | Performed by: PHYSICIAN ASSISTANT

## 2021-06-29 NOTE — PROGRESS NOTES
The patient is here because she has a vaginal infection  The patient has been having a bad, fishy odor for the past week and the half  The patient has clear, clumpy discharge  No itching or burning  The patient has urinary frequency and urgency  This started in the past three months  The patient also has heavy bleeding during her periods and mood swings  Her periods do come regularly

## 2021-06-30 NOTE — PROGRESS NOTES
Assessment/Plan:    No problem-specific Assessment & Plan notes found for this encounter  Diagnoses and all orders for this visit:    Vaginal discharge  -     GP Vaginitis/Vaginosis W/O PAP W/O HPV  Expanded  -     GP Cervicitis W/O PAP W/O HPV PLUS    Vaginal odor    Urinary urgency  -     POCT urine dip    Acute vaginitis  -     GP Vaginitis/Vaginosis W/O PAP W/O HPV  Expanded    Cervicitis  -     GP Cervicitis W/O PAP W/O HPV PLUS    Genital candidiasis  -     GP Vaginitis/Vaginosis W/O PAP W/O HPV  Expanded  -     GP Cervicitis W/O PAP W/O HPV PLUS    Screening for STDs (sexually transmitted diseases)  -     GP Vaginitis/Vaginosis W/O PAP W/O HPV  Expanded  -     GP Cervicitis W/O PAP W/O HPV PLUS    Other orders  -     Cholecalciferol (Vitamin D-3) 125 MCG (5000 UT) TABS; Take 1 tablet by mouth daily  -     Ascorbic Acid (VITAMIN C PO); Take 3 tablets by mouth daily          Subjective:      Patient ID: Vito Guerra is a 31 y o  female  Pt is a new old pt here for a problem today--  She complains of increase in vaginal discharge  She has a fishy odor  Not a lot of itching or burning  Also, c/o urinary urgency  No burning  No hematuria  No back pain  Denies any major change in meds/products/partner    H/o HS  Also,  Some increase in PMS  Periods a bit heavier--may need lysteda  Due for yearly    UA neg  Cultures done  Change products  Daily probiotic  Witch hazel  Hydrate  B complex for mood      The following portions of the patient's history were reviewed and updated as appropriate: allergies, current medications, past family history, past medical history, past social history, past surgical history and problem list     Review of Systems   Constitutional: Negative for chills, fever and unexpected weight change  Gastrointestinal: Negative for abdominal pain, blood in stool, constipation and diarrhea  Genitourinary: Positive for vaginal discharge  Negative for pelvic pain and vaginal bleeding  Objective:      /70   Ht 5' 6" (1 676 m)   Wt 112 kg (247 lb)   LMP 06/16/2021 (Exact Date)   BMI 39 87 kg/m²          Physical Exam  Vitals and nursing note reviewed  Constitutional:       Appearance: She is well-developed  Genitourinary:     Exam position: Supine  Labia:         Right: No rash or lesion  Left: No rash or lesion  Vagina: Normal       Cervix: No cervical motion tenderness, discharge or friability  Lymphadenopathy:      Lower Body: No right inguinal adenopathy  No left inguinal adenopathy

## 2021-07-02 LAB
A VAGINAE DNA VAG NAA+PROBE-LOG#: <3.25
A VAGINAE DNA VAG QL NAA+PROBE: NOT DETECTED
BVAB2 DNA VAG QL NAA+PROBE: NOT DETECTED
G VAGINALIS DNA SPEC QL NAA+PROBE: NOT DETECTED
G VAGINALIS DNA VAG NAA+PROBE-LOG#: <3.25
LACTOBACILLUS DNA VAG NAA+PROBE-LOG#: DETECTED
LACTOBACILLUS DNA VAG NAA+PROBE-LOG#: NORMAL
MEGA1 DNA VAG QL NAA+PROBE: NOT DETECTED
SL AMB C.ALBICANS BY PCR: NOT DETECTED
SL AMB CANDIDA GENUS: NOT DETECTED
T VAGINALIS RRNA SPEC QL NAA+PROBE: NOT DETECTED

## 2021-07-29 ENCOUNTER — HOSPITAL ENCOUNTER (OUTPATIENT)
Dept: CT IMAGING | Facility: HOSPITAL | Age: 31
Discharge: HOME | End: 2021-07-29
Payer: COMMERCIAL

## 2021-07-29 DIAGNOSIS — R10.31 ABDOMINAL PAIN, RIGHT LOWER QUADRANT: ICD-10-CM

## 2021-07-29 DIAGNOSIS — R10.9 STOMACH ACHE: ICD-10-CM

## 2021-07-29 DIAGNOSIS — R10.2 PELVIC PAIN: ICD-10-CM

## 2021-07-29 PROCEDURE — 74177 CT ABD & PELVIS W/CONTRAST: CPT

## 2021-07-29 PROCEDURE — G1004 CDSM NDSC: HCPCS

## 2021-07-29 RX ADMIN — IOHEXOL 100 ML: 350 INJECTION, SOLUTION INTRAVENOUS at 17:57

## 2021-08-03 ENCOUNTER — OFFICE VISIT (OUTPATIENT)
Dept: OBGYN CLINIC | Facility: CLINIC | Age: 31
End: 2021-08-03
Payer: COMMERCIAL

## 2021-08-03 VITALS
BODY MASS INDEX: 38.92 KG/M2 | WEIGHT: 242.2 LBS | SYSTOLIC BLOOD PRESSURE: 114 MMHG | DIASTOLIC BLOOD PRESSURE: 72 MMHG | HEIGHT: 66 IN

## 2021-08-03 DIAGNOSIS — N92.0 MENORRHAGIA WITH REGULAR CYCLE: Primary | ICD-10-CM

## 2021-08-03 DIAGNOSIS — E28.2 PCOS (POLYCYSTIC OVARIAN SYNDROME): ICD-10-CM

## 2021-08-03 PROCEDURE — 3008F BODY MASS INDEX DOCD: CPT | Performed by: PHYSICIAN ASSISTANT

## 2021-08-03 PROCEDURE — 99213 OFFICE O/P EST LOW 20 MIN: CPT | Performed by: PHYSICIAN ASSISTANT

## 2021-08-03 PROCEDURE — 1036F TOBACCO NON-USER: CPT | Performed by: PHYSICIAN ASSISTANT

## 2021-08-03 RX ORDER — TRANEXAMIC ACID 650 1/1
650 TABLET ORAL 3 TIMES DAILY
Qty: 30 TABLET | Refills: 1 | Status: SHIPPED | OUTPATIENT
Start: 2021-08-03

## 2021-08-04 NOTE — PROGRESS NOTES
Assessment/Plan:    No problem-specific Assessment & Plan notes found for this encounter  Diagnoses and all orders for this visit:    Menorrhagia with regular cycle  -     Tranexamic Acid 650 MG TABS; Take 1 tablet (650 mg total) by mouth 3 (three) times a day    PCOS (polycystic ovarian syndrome)          Subjective:      Patient ID: Alonso Pelletier is a 31 y o  female  Pt presents for a problem/discussion today  She had a CT scan done in ER ~ 2 weeks ago and it suggested PCOS--b/l ovarian follicles    Pt has regular cycles  Generally no pain  Pain 2 weeks ago was worse on left side  It has totally resolved  Bowel and bladder regular  No abnormal hair growth or acne    pcos discussed in detail  Pt will stay off of meds for now  Observe cycles      The following portions of the patient's history were reviewed and updated as appropriate: allergies, current medications, past family history, past medical history, past social history, past surgical history and problem list     Review of Systems   Constitutional: Negative for chills, fever and unexpected weight change  Gastrointestinal: Negative for abdominal pain, blood in stool, constipation and diarrhea  Genitourinary: Negative  Objective:      /72   Ht 5' 6" (1 676 m)   Wt 110 kg (242 lb 3 2 oz)   LMP 07/07/2021 (Within Days)   BMI 39 09 kg/m²          Physical Exam  Vitals and nursing note reviewed

## 2021-09-20 ENCOUNTER — ANNUAL EXAM (OUTPATIENT)
Dept: OBGYN CLINIC | Facility: CLINIC | Age: 31
End: 2021-09-20
Payer: COMMERCIAL

## 2021-09-20 VITALS
WEIGHT: 246 LBS | SYSTOLIC BLOOD PRESSURE: 124 MMHG | HEIGHT: 66 IN | BODY MASS INDEX: 39.53 KG/M2 | DIASTOLIC BLOOD PRESSURE: 82 MMHG

## 2021-09-20 DIAGNOSIS — Z01.419 ENCOUNTER FOR WELL WOMAN EXAM: Primary | ICD-10-CM

## 2021-09-20 DIAGNOSIS — N92.0 MENORRHAGIA WITH REGULAR CYCLE: ICD-10-CM

## 2021-09-20 DIAGNOSIS — Z11.51 SPECIAL SCREENING EXAMINATION FOR HUMAN PAPILLOMAVIRUS (HPV): ICD-10-CM

## 2021-09-20 DIAGNOSIS — Z12.39 ENCOUNTER FOR SCREENING BREAST EXAMINATION: ICD-10-CM

## 2021-09-20 DIAGNOSIS — Z01.419 CERVICAL SMEAR, AS PART OF ROUTINE GYNECOLOGICAL EXAMINATION: ICD-10-CM

## 2021-09-20 PROCEDURE — 3008F BODY MASS INDEX DOCD: CPT | Performed by: PHYSICIAN ASSISTANT

## 2021-09-20 PROCEDURE — G0476 HPV COMBO ASSAY CA SCREEN: HCPCS | Performed by: PHYSICIAN ASSISTANT

## 2021-09-20 PROCEDURE — G0145 SCR C/V CYTO,THINLAYER,RESCR: HCPCS | Performed by: PHYSICIAN ASSISTANT

## 2021-09-20 PROCEDURE — S0612 ANNUAL GYNECOLOGICAL EXAMINA: HCPCS | Performed by: PHYSICIAN ASSISTANT

## 2021-09-20 RX ORDER — MAGNESIUM 30 MG
30 TABLET ORAL 2 TIMES DAILY
COMMUNITY

## 2021-09-20 RX ORDER — VITAMIN B COMPLEX
1 CAPSULE ORAL DAILY
COMMUNITY

## 2021-09-20 RX ORDER — ZINC GLUCONATE 50 MG
50 TABLET ORAL DAILY
COMMUNITY
End: 2022-01-18 | Stop reason: ALTCHOICE

## 2021-09-20 NOTE — PROGRESS NOTES
Assessment/Plan:    No problem-specific Assessment & Plan notes found for this encounter  Diagnoses and all orders for this visit:    Encounter for well woman exam    Encounter for screening breast examination    Cervical smear, as part of routine gynecological examination  -     Liquid-based pap, screening    Special screening examination for human papillomavirus (HPV)  -     Liquid-based pap, screening    Menorrhagia with regular cycle  Comments:  h/o    Other orders  -     b complex vitamins capsule; Take 1 capsule by mouth daily  -     magnesium 30 MG tablet; Take 30 mg by mouth 2 (two) times a day  -     zinc gluconate 50 mg tablet; Take 50 mg by mouth daily          Subjective:      Patient ID: Joyce Herrera is a 31 y o  female  Pt presents for her annual exam today--  She has no complaints  She has reg, occ heavy bleeding   No pelvic pain  lysteda has been helping  Bowel and bladder are regular  No breast concerns today      pap today  Will call for lysteda refills prn  Daily mvi      The following portions of the patient's history were reviewed and updated as appropriate: allergies, current medications, past family history, past medical history, past social history, past surgical history and problem list     Review of Systems   Constitutional: Negative for chills, fever and unexpected weight change  Gastrointestinal: Negative for abdominal pain, blood in stool, constipation and diarrhea  Genitourinary: Negative  Objective:      /82   Ht 5' 6" (1 676 m)   Wt 112 kg (246 lb)   LMP 09/06/2021 (Exact Date)   Breastfeeding No   BMI 39 71 kg/m²          Physical Exam  Vitals and nursing note reviewed  Constitutional:       Appearance: She is well-developed  HENT:      Head: Normocephalic and atraumatic  Chest:      Breasts:         Right: No inverted nipple, mass, nipple discharge or skin change  Left: No inverted nipple, mass, nipple discharge or skin change  Abdominal:      Palpations: Abdomen is soft  Genitourinary:     Exam position: Supine  Labia:         Right: No rash, tenderness or lesion  Left: No rash, tenderness or lesion  Vagina: Normal       Cervix: No cervical motion tenderness, discharge or friability  Adnexa:         Right: No mass, tenderness or fullness  Left: No mass, tenderness or fullness  Musculoskeletal:      Cervical back: Normal range of motion  Lymphadenopathy:      Lower Body: No right inguinal adenopathy  No left inguinal adenopathy

## 2021-09-20 NOTE — PROGRESS NOTES
Patient is here for yearly exam   Patient is having regualr cycles, no breast concerns and B&B ok  Patient is due for a pap smear with HPV testing at this visit  6/29/21 Negative Cervicits and Vaginitis culutres

## 2021-09-23 LAB
HPV HR 12 DNA CVX QL NAA+PROBE: NEGATIVE
HPV16 DNA CVX QL NAA+PROBE: NEGATIVE
HPV18 DNA CVX QL NAA+PROBE: NEGATIVE

## 2021-09-27 LAB
LAB AP GYN PRIMARY INTERPRETATION: NORMAL
Lab: NORMAL

## 2021-11-17 ENCOUNTER — TELEPHONE (OUTPATIENT)
Dept: OBGYN CLINIC | Facility: CLINIC | Age: 31
End: 2021-11-17

## 2021-12-27 NOTE — H&P (VIEW-ONLY)
Assessment/Plan:    Based upon the history given and current physical findings, I have recommended that the patient undergo a tonsillectomy  All risks, benefits, alternatives, and complications of the procedure have been reviewed in detail  The risks of the surgery include but are not limited to: bleeding, infection, voice change, recurrent sore throat, swallowing difficulty, and the need for further surgery  The patient / parent understands and accept all risks of the surgery  Pre-operative lab tests have been ordered  Post operative instructions were reivewed and given to the patient / parent  Post operative medication was given and the patient / parent was  instructed to fill the medication in preparation for the surgery  A  post-operative appointment has been made for the patient  If any questions should arise prior to or after the surgery, the patient / parent should call my office and I will be glad to discuss any questions or concerns with them  Encounter Diagnosis     ICD-10-CM    1  Other chronic diseases of tonsils and adenoids  J35 8 oxyCODONE-acetaminophen (PERCOCET) 5-325 mg per tablet   2  Allergic rhinitis due to pollen, unspecified seasonality  J30 1 predniSONE 20 mg tablet              Patient ID: Joshua Hernandez is a 27 y o  female  Patrica Reza was last in the office and seen by my PA in August 2021  At that time she was seen for her allergy check and was also having some sinus issues and facial pain and tonsil stones  No evidence of infection was noted  She was given some information on TMJ and was instructed to follow up with a dentist   She had a  created for her and she has been wearing this  She was also set up for a sleep study but never had this completed  She is here today for evaluation of a lesion in the oral cavity  She noticed a white spot in the tonsil which she felt was a stone - she tried to push this out and nothing came out    Eventually it became as large as a lima bean  She then choked one week ago on food and it dislodged  The pain is still present in the area of the left tonsil and ear  She also has chronic halitosis despite using a water pick to remove the stones  The following portions of the patient's history were reviewed and updated as appropriate: allergies, current medications, past family history, past medical history, past social history, past surgical history and problem list     Review of Systems   Constitutional: Negative for activity change, appetite change, diaphoresis, fatigue, fever and unexpected weight change  HENT: Positive for ear pain (left sided) and sore throat (left tonsillar area)  Negative for congestion, ear discharge, hearing loss, nosebleeds, postnasal drip, rhinorrhea, sinus pressure, sinus pain, sneezing, tinnitus, trouble swallowing and voice change  Eyes: Negative  Negative for photophobia, pain, discharge, itching and visual disturbance  Respiratory: Negative  Negative for cough, chest tightness and shortness of breath  Cardiovascular: Negative  Negative for chest pain  Gastrointestinal: Negative  Negative for abdominal pain and nausea  Endocrine: Negative  Musculoskeletal: Negative  Negative for gait problem, neck pain and neck stiffness  Skin: Negative  Allergic/Immunologic: Negative  Negative for environmental allergies  Neurological: Negative  Negative for dizziness, speech difficulty, light-headedness and headaches  Hematological: Negative  Negative for adenopathy  Psychiatric/Behavioral: Negative  Negative for sleep disturbance  The patient is not nervous/anxious  /80   Pulse 87   Ht 5' 6" (1 676 m)   Wt 116 kg (256 lb 9 6 oz)   BMI 41 42 kg/m²     PHYSICAL  EXAMINATION    CONSTITUTION:    Appears appropriate for age  No evidence of any acute distress  Communicates normally  Voice quality is clear  Alert and oriented      HEAD/FACE: Atraumatic, normocephalic on inspection  No scars present  Salivary glands are normal in texture and size without any asymmetry  Facial nerve function is symmetric and normal     EYES:    Extraocular muscles intact in both eyes, normal gaze bilaterally and no evidence of nystagmus  Pupils equal, round, and accommodate to light bilaterally  EARS:    External ears normal     External canals are clear and dry  Tympanic membranes intact with normal mobility, no effusion, no retraction, no perforation  Post auricular area is normal    NOSE:    External nose without deformity  Internal mucosa pink and moist     Septum with right deviation  Nasal turbinates with hypertrophy and pallor  ORAL CAVITY:    Lips normal and healthy in appearance  Dentition normal     Gums healthy, pink and moist     Tongue appears pink and moist with no lesions  Floor of mouth pink, moist, and smooth  Submandibular ducts patent with clear saliva  Parotid ducts patent with clear saliva  Oral mucosa pink and moist     Hard palate normal in appearance without any lesions  OROPHARYNX:    Soft palate pink and moist without any lesions  Uvula midline without any lesions  Tonsils grade 1 5 bilaterally  Cryptic    Posterior pharynx pink and moist without any lesions  ++ thick PND    NECK:    Supple and symmetric  No masses noted  Trachea midline  No thyromegaly or nodules noted  LYMPH:    No palpable adenopathy in left or right neck    SKIN:    No rashes  No lesions noted       HEART:   Regular rate and rhythm    LUNGS:  Clear to auscultation bilaterally

## 2022-01-10 ENCOUNTER — APPOINTMENT (OUTPATIENT)
Dept: LAB | Facility: IMAGING CENTER | Age: 32
End: 2022-01-10
Payer: COMMERCIAL

## 2022-01-10 DIAGNOSIS — J35.8 OTHER CHRONIC DISEASES OF TONSILS AND ADENOIDS: ICD-10-CM

## 2022-01-10 DIAGNOSIS — Z01.812 PRE-OPERATIVE LABORATORY EXAMINATION: ICD-10-CM

## 2022-01-10 DIAGNOSIS — Z01.818 PREOPERATIVE TESTING: ICD-10-CM

## 2022-01-10 LAB
APTT PPP: 35 SECONDS (ref 23–37)
BASOPHILS # BLD AUTO: 0.03 THOUSANDS/ΜL (ref 0–0.1)
BASOPHILS NFR BLD AUTO: 0 % (ref 0–1)
EOSINOPHIL # BLD AUTO: 0.2 THOUSAND/ΜL (ref 0–0.61)
EOSINOPHIL NFR BLD AUTO: 2 % (ref 0–6)
ERYTHROCYTE [DISTWIDTH] IN BLOOD BY AUTOMATED COUNT: 12.6 % (ref 11.6–15.1)
HCT VFR BLD AUTO: 38.4 % (ref 34.8–46.1)
HGB BLD-MCNC: 12.2 G/DL (ref 11.5–15.4)
IMM GRANULOCYTES # BLD AUTO: 0.03 THOUSAND/UL (ref 0–0.2)
IMM GRANULOCYTES NFR BLD AUTO: 0 % (ref 0–2)
INR PPP: 0.97 (ref 0.84–1.19)
LYMPHOCYTES # BLD AUTO: 2.35 THOUSANDS/ΜL (ref 0.6–4.47)
LYMPHOCYTES NFR BLD AUTO: 28 % (ref 14–44)
MCH RBC QN AUTO: 27.7 PG (ref 26.8–34.3)
MCHC RBC AUTO-ENTMCNC: 31.8 G/DL (ref 31.4–37.4)
MCV RBC AUTO: 87 FL (ref 82–98)
MONOCYTES # BLD AUTO: 0.54 THOUSAND/ΜL (ref 0.17–1.22)
MONOCYTES NFR BLD AUTO: 6 % (ref 4–12)
NEUTROPHILS # BLD AUTO: 5.26 THOUSANDS/ΜL (ref 1.85–7.62)
NEUTS SEG NFR BLD AUTO: 64 % (ref 43–75)
NRBC BLD AUTO-RTO: 0 /100 WBCS
PLATELET # BLD AUTO: 240 THOUSANDS/UL (ref 149–390)
PMV BLD AUTO: 10.9 FL (ref 8.9–12.7)
PROTHROMBIN TIME: 12.6 SECONDS (ref 11.6–14.5)
RBC # BLD AUTO: 4.41 MILLION/UL (ref 3.81–5.12)
WBC # BLD AUTO: 8.41 THOUSAND/UL (ref 4.31–10.16)

## 2022-01-10 PROCEDURE — 36415 COLL VENOUS BLD VENIPUNCTURE: CPT

## 2022-01-10 PROCEDURE — 85610 PROTHROMBIN TIME: CPT

## 2022-01-10 PROCEDURE — 85730 THROMBOPLASTIN TIME PARTIAL: CPT

## 2022-01-10 PROCEDURE — 85025 COMPLETE CBC W/AUTO DIFF WBC: CPT

## 2022-01-14 ENCOUNTER — ANESTHESIA EVENT (OUTPATIENT)
Dept: PERIOP | Facility: HOSPITAL | Age: 32
End: 2022-01-14
Payer: COMMERCIAL

## 2022-01-18 NOTE — PRE-PROCEDURE INSTRUCTIONS
Pre-Surgery Instructions:   Medication Instructions    ALPRAZolam (XANAX) 0 25 mg tablet Instructed patient per Anesthesia Guidelines  prn,may use    Ascorbic Acid (VITAMIN C PO) Instructed patient per Anesthesia Guidelines  hold am of sx    b complex vitamins capsule Instructed patient per Anesthesia Guidelines  hold am of sx    betamethasone dipropionate (DIPROSONE) 0 05 % cream Instructed patient per Anesthesia Guidelines  prn    Cholecalciferol (Vitamin D-3) 125 MCG (5000 UT) TABS Instructed patient per Anesthesia Guidelines  hold am of sx    clindamycin (CLEOCIN T) 1 % lotion Instructed patient per Anesthesia Guidelines  prn    EPINEPHrine (EPIPEN) 0 3 mg/0 3 mL SOAJ Instructed patient per Anesthesia Guidelines  prn    FLUoxetine (PROzac) 40 MG capsule Instructed patient per Anesthesia Guidelines  take am of sx    magnesium 30 MG tablet Instructed patient per Anesthesia Guidelines  hold am of sx    Tranexamic Acid 650 MG TABS Instructed patient per Anesthesia Guidelines  prn    You will receive a phone call from hospital for arrival time  Please call surgeons office if any changes in your condition  Wear easy on/off clothing; consider type of surgery;  Valuables, jewelry, piercing's please keep at home  **COVID-19  education/surgical guidelines  Updated covid    Visitation policy  Please: No contact lenses or eye make up, artificial eyelashes    Please secure transportation     Follow pre surgery showering or cleaning instructions as  Reviewed by nurse or surgeons office      Questions answered and concerns addressed

## 2022-01-25 ENCOUNTER — HOSPITAL ENCOUNTER (OUTPATIENT)
Facility: HOSPITAL | Age: 32
Setting detail: OUTPATIENT SURGERY
Discharge: HOME | End: 2022-01-25
Attending: OTOLARYNGOLOGY | Admitting: OTOLARYNGOLOGY
Payer: COMMERCIAL

## 2022-01-25 ENCOUNTER — ANESTHESIA (OUTPATIENT)
Dept: PERIOP | Facility: HOSPITAL | Age: 32
End: 2022-01-25
Payer: COMMERCIAL

## 2022-01-25 VITALS
WEIGHT: 257.28 LBS | BODY MASS INDEX: 41.35 KG/M2 | RESPIRATION RATE: 18 BRPM | DIASTOLIC BLOOD PRESSURE: 79 MMHG | OXYGEN SATURATION: 98 % | HEIGHT: 66 IN | SYSTOLIC BLOOD PRESSURE: 130 MMHG | HEART RATE: 69 BPM | TEMPERATURE: 97.6 F

## 2022-01-25 DIAGNOSIS — Z01.818 PREOPERATIVE TESTING: ICD-10-CM

## 2022-01-25 DIAGNOSIS — J35.8 OTHER CHRONIC DISEASES OF TONSILS AND ADENOIDS: ICD-10-CM

## 2022-01-25 DIAGNOSIS — Z01.812 PRE-OPERATIVE LABORATORY EXAMINATION: ICD-10-CM

## 2022-01-25 LAB
EXT PREGNANCY TEST URINE: NEGATIVE
EXT. CONTROL: NORMAL

## 2022-01-25 PROCEDURE — 42826 REMOVAL OF TONSILS: CPT | Performed by: OTOLARYNGOLOGY

## 2022-01-25 PROCEDURE — 88304 TISSUE EXAM BY PATHOLOGIST: CPT | Performed by: PATHOLOGY

## 2022-01-25 PROCEDURE — 81025 URINE PREGNANCY TEST: CPT | Performed by: OTOLARYNGOLOGY

## 2022-01-25 RX ORDER — OXYCODONE HYDROCHLORIDE AND ACETAMINOPHEN 5; 325 MG/1; MG/1
2 TABLET ORAL EVERY 4 HOURS PRN
Status: DISCONTINUED | OUTPATIENT
Start: 2022-01-25 | End: 2022-01-25 | Stop reason: HOSPADM

## 2022-01-25 RX ORDER — DEXTROSE AND SODIUM CHLORIDE 5; .9 G/100ML; G/100ML
125 INJECTION, SOLUTION INTRAVENOUS CONTINUOUS
Status: DISCONTINUED | OUTPATIENT
Start: 2022-01-25 | End: 2022-01-25 | Stop reason: HOSPADM

## 2022-01-25 RX ORDER — ONDANSETRON 2 MG/ML
4 INJECTION INTRAMUSCULAR; INTRAVENOUS ONCE AS NEEDED
Status: DISCONTINUED | OUTPATIENT
Start: 2022-01-25 | End: 2022-01-25 | Stop reason: HOSPADM

## 2022-01-25 RX ORDER — MIDAZOLAM HYDROCHLORIDE 2 MG/2ML
INJECTION, SOLUTION INTRAMUSCULAR; INTRAVENOUS AS NEEDED
Status: DISCONTINUED | OUTPATIENT
Start: 2022-01-25 | End: 2022-01-25

## 2022-01-25 RX ORDER — PROPOFOL 10 MG/ML
INJECTION, EMULSION INTRAVENOUS CONTINUOUS PRN
Status: DISCONTINUED | OUTPATIENT
Start: 2022-01-25 | End: 2022-01-25

## 2022-01-25 RX ORDER — SUCCINYLCHOLINE/SOD CL,ISO/PF 100 MG/5ML
SYRINGE (ML) INTRAVENOUS AS NEEDED
Status: DISCONTINUED | OUTPATIENT
Start: 2022-01-25 | End: 2022-01-25

## 2022-01-25 RX ORDER — FENTANYL CITRATE/PF 50 MCG/ML
25 SYRINGE (ML) INJECTION
Status: DISCONTINUED | OUTPATIENT
Start: 2022-01-25 | End: 2022-01-25 | Stop reason: HOSPADM

## 2022-01-25 RX ORDER — ONDANSETRON 2 MG/ML
INJECTION INTRAMUSCULAR; INTRAVENOUS AS NEEDED
Status: DISCONTINUED | OUTPATIENT
Start: 2022-01-25 | End: 2022-01-25

## 2022-01-25 RX ORDER — ACETAMINOPHEN 325 MG/1
650 TABLET ORAL EVERY 4 HOURS PRN
Status: DISCONTINUED | OUTPATIENT
Start: 2022-01-25 | End: 2022-01-25 | Stop reason: HOSPADM

## 2022-01-25 RX ORDER — SODIUM CHLORIDE 9 MG/ML
125 INJECTION, SOLUTION INTRAVENOUS CONTINUOUS
Status: DISCONTINUED | OUTPATIENT
Start: 2022-01-25 | End: 2022-01-25 | Stop reason: HOSPADM

## 2022-01-25 RX ORDER — HYDROMORPHONE HCL/PF 1 MG/ML
SYRINGE (ML) INJECTION AS NEEDED
Status: DISCONTINUED | OUTPATIENT
Start: 2022-01-25 | End: 2022-01-25

## 2022-01-25 RX ORDER — DEXAMETHASONE SODIUM PHOSPHATE 4 MG/ML
INJECTION, SOLUTION INTRA-ARTICULAR; INTRALESIONAL; INTRAMUSCULAR; INTRAVENOUS; SOFT TISSUE AS NEEDED
Status: DISCONTINUED | OUTPATIENT
Start: 2022-01-25 | End: 2022-01-25

## 2022-01-25 RX ORDER — MAGNESIUM HYDROXIDE 1200 MG/15ML
LIQUID ORAL AS NEEDED
Status: DISCONTINUED | OUTPATIENT
Start: 2022-01-25 | End: 2022-01-25 | Stop reason: HOSPADM

## 2022-01-25 RX ORDER — PROPOFOL 10 MG/ML
INJECTION, EMULSION INTRAVENOUS AS NEEDED
Status: DISCONTINUED | OUTPATIENT
Start: 2022-01-25 | End: 2022-01-25

## 2022-01-25 RX ORDER — ONDANSETRON 2 MG/ML
4 INJECTION INTRAMUSCULAR; INTRAVENOUS EVERY 6 HOURS PRN
Status: DISCONTINUED | OUTPATIENT
Start: 2022-01-25 | End: 2022-01-25 | Stop reason: HOSPADM

## 2022-01-25 RX ADMIN — MIDAZOLAM 2 MG: 1 INJECTION INTRAMUSCULAR; INTRAVENOUS at 07:21

## 2022-01-25 RX ADMIN — SODIUM CHLORIDE: 0.9 INJECTION, SOLUTION INTRAVENOUS at 07:39

## 2022-01-25 RX ADMIN — FENTANYL CITRATE 25 MCG: 50 INJECTION INTRAMUSCULAR; INTRAVENOUS at 08:20

## 2022-01-25 RX ADMIN — LIDOCAINE HYDROCHLORIDE 100 MG: 20 INJECTION INTRAVENOUS at 07:28

## 2022-01-25 RX ADMIN — Medication 80 MG: at 07:28

## 2022-01-25 RX ADMIN — PROPOFOL 150 MCG/KG/MIN: 10 INJECTION, EMULSION INTRAVENOUS at 07:28

## 2022-01-25 RX ADMIN — ONDANSETRON 4 MG: 2 INJECTION INTRAMUSCULAR; INTRAVENOUS at 07:53

## 2022-01-25 RX ADMIN — HYDROMORPHONE HYDROCHLORIDE 0.5 MG: 1 INJECTION, SOLUTION INTRAMUSCULAR; INTRAVENOUS; SUBCUTANEOUS at 07:32

## 2022-01-25 RX ADMIN — PROPOFOL 200 MG: 10 INJECTION, EMULSION INTRAVENOUS at 07:28

## 2022-01-25 RX ADMIN — SODIUM CHLORIDE 125 ML/HR: 0.9 INJECTION, SOLUTION INTRAVENOUS at 05:49

## 2022-01-25 RX ADMIN — FENTANYL CITRATE 25 MCG: 50 INJECTION INTRAMUSCULAR; INTRAVENOUS at 08:29

## 2022-01-25 RX ADMIN — DEXAMETHASONE SODIUM PHOSPHATE 8 MG: 4 INJECTION INTRA-ARTICULAR; INTRALESIONAL; INTRAMUSCULAR; INTRAVENOUS; SOFT TISSUE at 07:28

## 2022-01-25 RX ADMIN — REMIFENTANIL HYDROCHLORIDE 0.15 MCG/KG/MIN: 1 INJECTION, POWDER, LYOPHILIZED, FOR SOLUTION INTRAVENOUS at 07:28

## 2022-01-25 NOTE — INTERVAL H&P NOTE
H&P reviewed  After examining the patient I find no changes in the patients condition since the H&P had been written      Vitals:    01/25/22 0536   BP: 110/53   Pulse: 70   Resp: 16   Temp: 98 °F (36 7 °C)   SpO2: 98%

## 2022-01-25 NOTE — ANESTHESIA PREPROCEDURE EVALUATION
Procedure:  TONSILLECTOMY (N/A Throat)    Relevant Problems   NEURO/PSYCH   (+) Anxiety   (+) History of bipolar disorder      PULMONARY  questionable sleep apnea- pt to go for sleep study      Other   (+) Class 2 obesity due to excess calories without serious comorbidity in adult        Physical Exam    Airway    Mallampati score: II  TM Distance: >3 FB  Neck ROM: full     Dental   No notable dental hx     Cardiovascular  Cardiovascular exam normal    Pulmonary  Pulmonary exam normal     Other Findings        Anesthesia Plan  ASA Score- 3     Anesthesia Type- general with ASA Monitors  Additional Monitors:   Airway Plan: ETT  Plan Factors-Exercise tolerance (METS): >4 METS  Chart reviewed  Patient is not a current smoker  Patient instructed to abstain from smoking on day of procedure  Patient did not smoke on day of surgery  Obstructive sleep apnea risk education given perioperatively  Induction- intravenous  Postoperative Plan- Plan for postoperative opioid use  Planned trial extubation    Informed Consent- Anesthetic plan and risks discussed with patient

## 2022-01-25 NOTE — DISCHARGE INSTRUCTIONS
Shad Rios  1990      [facility]      Postoperative tonsillectomy instructions    1  Force fluids as much as possible  This will promote healing and maintain adequate hydration  Certain fruit juices such as apple and apricot juice, soft drinks, and frozen drink bars are suggested  2   Do not drink through a straw  This may cause bleeding if there is contact with the surgical site  3   Milk or ice cream should be avoided for the first 24 hours due to increased mucus production  Soft foods like gelatin, ice cream, custard, puddings, and mashed foods are helpful to maintain adequate nutrition  Spicy, scratchy, or rough foods such as toast, crackers, and potato chips should be avoided since they may scratch the tonsil site and cause bleeding  4   No red colored food or liquid  5   A moderate amount of throat and ear discomfort is to be expected  6   Foul-smelling breath is normal and is not a sign of infection  7   You may see crusty white patches in your throat  This is a temporary normal covering during the healing period and is NOT a sign of infection  After the first week the white patches can be expected to come off and may cause slight bleeding  The best way to prevent buildup of too much crusting and bleeding is to keep the throat moist with lots of fluids  8   You should have lots of rest and limited activity at home until your first postoperative visit  No strenuous activities, bending, or lifting until approved by the surgeon  No travel out of your immediate area  9   If severe pain, bleeding, or fever greater than 101°F notify our office immediately at [phone] or [phone] or go immediately to the emergency room  10   If a prescription for pain medication was given follow appropriate directions on label  If no prescription was given you may take over the counter pain medication as needed      11   If a prescription for steroids (Prednisone, prednisolone) was given you may begin taking this medication the day following the surgical procedure  12  No smoking  Avoid second hand smoke exposure

## 2022-01-25 NOTE — ANESTHESIA POSTPROCEDURE EVALUATION
Post-Op Assessment Note    CV Status:  Stable    Pain management: adequate     Mental Status:  Alert and awake   Hydration Status:  Euvolemic   PONV Controlled:  Controlled   Airway Patency:  Patent      Post Op Vitals Reviewed: Yes      Staff: Anesthesiologist         No complications documented      /90 (01/25/22 0859)    Temp 98 °F (36 7 °C) (01/25/22 0859)    Pulse 70 (01/25/22 0859)   Resp 18 (01/25/22 0859)    SpO2 98 % (01/25/22 0859)

## 2022-01-25 NOTE — OP NOTE
PERATIVE REPORT  PATIENT NAME: Jennie Mariano    :  1990  MRN: [id number]  Pt Location: [facility] OR ROOM 04    SURGERY DATE: 2022    Surgeon(s) and Role:     * Marlene Soto DO - Primary    Preop Diagnosis:  Other chronic diseases of tonsils and adenoids [J35 8]  Preoperative testing [Z01 818]  Pre-operative laboratory examination [Z01 812]    Post-Op Diagnosis Codes:     * Other chronic diseases of tonsils and adenoids [J35 8]     * Preoperative testing [Z01 818]     * Pre-operative laboratory examination [Z01 812]    Procedure(s) (LRB):  TONSILLECTOMY (N/A)    Specimen(s):  ID Type Source Tests Collected by Time Destination   1 : BL TONSILS Tissue Tonsil TISSUE EXAM Marlene Taylor DO 2022 0729        Estimated Blood Loss:   Minimal    Drains:  * No LDAs found *    Anesthesia Type:   General    Operative Indications: Other chronic diseases of tonsils and adenoids [J35 8]  Preoperative testing [Z01 818]  Pre-operative laboratory examination [Z01 812]      Operative Findings:  Cryptic tonsils      Complications:   None    Procedure and Technique:  Patient was identified in the holding area and taken to the OR  Patient was placed on the OR table in supine position and placed under general anesthesia with an endotracheal tube  Table was turned 90 degrees and prepped and draped in usual fashion for the above procedure  Time out was obtained and all information correct and agreed upon by surgeon, OR staff and anesthesia  The oral cavity was opened using a McGyver mouthgag and held in place with strauss stand suspension  Evaluation of the oral cavity revealed that the left tonsil was grade 1 and the right tonsil was grade 1  Attention was first turned to the right tonsil  This was grasped with a curved Rashmi forceps and pulled medially  Using the coblation wand on the standard settings the tonsil was removed from the tonsillar fossa using the coblation setting    Any visible vessels which were seen just under the tissue were coagulated with the coagulation setting on the unit  The same procedure was then completed on the opposite side  At the completion of the tonsillectomy the fossae were dry  Soft suction catheter was then passed into the esophagus and stomach to remove any gastric contents and then removed  The mouthgag was let down and then reopened to evaluated the oropharyngeal area to make sure that there was no bleeding  Once confirmed, the mouthgag was removed and the bed turned back 90 degrees towards anesthesia  The patient was awoken, extubated and taken to the PACU in stable condition       I was present for the entire procedure    Patient Disposition:  PACU       SIGNATURE: Mena Chambers DO  DATE: January 25, 2022  TIME: 7:59 AM

## 2022-02-21 ENCOUNTER — OFFICE VISIT (OUTPATIENT)
Dept: BARIATRICS | Facility: CLINIC | Age: 32
End: 2022-02-21

## 2022-02-21 VITALS — BODY MASS INDEX: 41.12 KG/M2 | HEIGHT: 66 IN | WEIGHT: 255.9 LBS

## 2022-02-21 DIAGNOSIS — R63.5 ABNORMAL WEIGHT GAIN: ICD-10-CM

## 2022-02-21 DIAGNOSIS — E66.01 MORBID OBESITY (HCC): ICD-10-CM

## 2022-02-21 PROCEDURE — WMDI30: Performed by: DIETITIAN, REGISTERED

## 2022-02-21 PROCEDURE — RECHECK: Performed by: DIETITIAN, REGISTERED

## 2022-02-21 NOTE — PROGRESS NOTES
Weight Management Medical Nutrition Assessment  Sharee Garza presented for a meal planning session  Today's weight is 255 9#   Per dietary recall patient consumes excess calories from larger portions at meals and snacking on sweet /salty carbs at night  She is a  and finds that she often will eat out of boredom when driving  She is currently not food logging and her physical activity level is low   Developed and reviewed a low calorie meal plan    Patient seen by Medical Provider in past 6 months:  no  Requested to schedule appointment with Medical Provider: no      Anthropometric Measurements  Start Weight (#): 255 9  Current Weight (#): n/a  TBW % Change from start weight:n/a  Ideal Body Weight (#):  Goal Weight (#):ST# LTG; under 200#  Last Year - 219#    Weight Loss History  Previous weight loss attempts: Commercial Programs ([employer], Jayleen Mcleod, etc )  Self Created Diets (Portion Control, Healthy Food Choices, etc )    Food and Nutrition Related History  Wake up: 2:30am   Bed Time:8:00pm     4am- 4pm 5x week    Food Recall  Breakfast:4:30am-6:00am  Breakfast sandwich  Mar Hughs / Egg/ Cheese on Everything Bagel or English Muffin or 2x Oatmeal packets    Snack:7:00am - banana or apple or [employer]  Lunch: 9:00am Leftovers - Chicken / rice/ veggies   Salad with chicken with homemade with oil/ vinegar  Snack:12:00pm yogurt   Snack: Cheese- its   Dinner:Chicken or Beef / rice / vegetables - occasionally seconds  Snack:Cheese-its    Beverages: water  Volume of beverage intake: 60oz    Weekends: Worse, Sweets and larger portions  Cravings: sweets  / salty cheese its or [place] ice cream   Trouble area of day:portion control    Frequency of Eating out: irregularly  Food restrictions:none reported  Cooking: self   Food Shopping: self    Physical Activity Intake  Activity:none  Frequency:infrequently  Physical limitations/barriers to exercise: none reported    Estimated Needs  Energy  [place] Energy Needs:  BMR : 1870 calories   1-2# loss weekly sedentary:2938-9122 calories               1-2# loss weekly lightly active: 6364-4512 calories  Maintenance calories for sedentary activity level: 2244 calories  Protein: 71-89gm     (1 2-1 5g/kg IBW)  Fluid: 69oz   (35mL/kg IBW)    Nutrition Diagnosis  Patient with obesity grade III related to excessive energy intake as evidenced by BMI 41    Nutrition Intervention    Nutrition Prescription  Calories:1400 calories on sedentary days and  Flex to 1600 calories on cardio days  Protein:70-90gm  Fluid:70oz    Meal Plan (Royer/Pro/Carb)  Breakfast: 300/20/30  Snack:  Lunch: 400/30/30-45  Snack: 150/>5/20  Dinner: 400/30/30-45  Snack: 150/>5/20    Nutrition Education:    Calorie controlled menu  Lean protein food choices  Healthy snack options  Food journaling tips      Nutrition Counseling:  Strategies: meal planning, portion sizes, healthy snack choices, hydration, fiber intake, protein intake, exercise, food journal      Monitoring and Evaluation:  Evaluation criteria:  Energy Intake  Meet protein needs  Maintain adequate hydration  Monitor weekly weight  Meal planning/preparation  Food journal   Decreased portions at mealtimes and snacks  Physical activity     Barriers to learning:no  Readiness to change: preparation  Comprehension: good  Expected Compliance: good

## 2022-03-21 ENCOUNTER — OFFICE VISIT (OUTPATIENT)
Dept: BARIATRICS | Facility: CLINIC | Age: 32
End: 2022-03-21

## 2022-03-21 VITALS — WEIGHT: 246.2 LBS | HEIGHT: 66 IN | BODY MASS INDEX: 39.57 KG/M2

## 2022-03-21 DIAGNOSIS — R63.5 ABNORMAL WEIGHT GAIN: Primary | ICD-10-CM

## 2022-03-21 PROCEDURE — WEIGHT: Performed by: DIETITIAN, REGISTERED

## 2022-03-21 PROCEDURE — RECHECK: Performed by: DIETITIAN, REGISTERED

## 2022-04-18 ENCOUNTER — OFFICE VISIT (OUTPATIENT)
Dept: BARIATRICS | Facility: CLINIC | Age: 32
End: 2022-04-18

## 2022-04-18 VITALS — WEIGHT: 242.73 LBS | HEIGHT: 66 IN | BODY MASS INDEX: 39.01 KG/M2

## 2022-04-18 DIAGNOSIS — R63.5 ABNORMAL WEIGHT GAIN: ICD-10-CM

## 2022-04-18 PROCEDURE — WEIGHT: Performed by: DIETITIAN, REGISTERED

## 2022-04-18 PROCEDURE — RECHECK: Performed by: DIETITIAN, REGISTERED

## 2022-05-09 ENCOUNTER — OFFICE VISIT (OUTPATIENT)
Dept: BARIATRICS | Facility: CLINIC | Age: 32
End: 2022-05-09

## 2022-05-09 VITALS — BODY MASS INDEX: 38.39 KG/M2 | HEIGHT: 66 IN | WEIGHT: 238.87 LBS

## 2022-05-09 DIAGNOSIS — R63.5 ABNORMAL WEIGHT GAIN: ICD-10-CM

## 2022-05-09 PROCEDURE — WEIGHT: Performed by: DIETITIAN, REGISTERED

## 2022-05-09 PROCEDURE — RECHECK: Performed by: DIETITIAN, REGISTERED

## 2022-06-13 ENCOUNTER — OFFICE VISIT (OUTPATIENT)
Dept: BARIATRICS | Facility: CLINIC | Age: 32
End: 2022-06-13

## 2022-06-13 VITALS — WEIGHT: 238.1 LBS | HEIGHT: 66 IN | BODY MASS INDEX: 38.27 KG/M2

## 2022-06-13 DIAGNOSIS — R63.5 ABNORMAL WEIGHT GAIN: ICD-10-CM

## 2022-06-13 PROCEDURE — WEIGHT: Performed by: DIETITIAN, REGISTERED

## 2022-06-13 PROCEDURE — RECHECK: Performed by: DIETITIAN, REGISTERED

## 2022-09-12 ENCOUNTER — OFFICE VISIT (OUTPATIENT)
Dept: BARIATRICS | Facility: CLINIC | Age: 32
End: 2022-09-12

## 2022-09-12 VITALS — WEIGHT: 236 LBS | HEIGHT: 66 IN | BODY MASS INDEX: 37.93 KG/M2

## 2022-09-12 DIAGNOSIS — R63.5 ABNORMAL WEIGHT GAIN: ICD-10-CM

## 2022-09-12 PROCEDURE — WEIGHT: Performed by: DIETITIAN, REGISTERED

## 2022-09-12 PROCEDURE — RECHECK: Performed by: DIETITIAN, REGISTERED

## 2022-10-03 ENCOUNTER — ANNUAL EXAM (OUTPATIENT)
Dept: GYNECOLOGY | Facility: CLINIC | Age: 32
End: 2022-10-03
Payer: COMMERCIAL

## 2022-10-03 VITALS
DIASTOLIC BLOOD PRESSURE: 80 MMHG | BODY MASS INDEX: 38.57 KG/M2 | HEIGHT: 66 IN | SYSTOLIC BLOOD PRESSURE: 120 MMHG | WEIGHT: 240 LBS

## 2022-10-03 DIAGNOSIS — Z12.39 ENCOUNTER FOR SCREENING BREAST EXAMINATION: ICD-10-CM

## 2022-10-03 DIAGNOSIS — Z01.419 ENCOUNTER FOR WELL WOMAN EXAM: Primary | ICD-10-CM

## 2022-10-03 DIAGNOSIS — Z30.011 INITIATION OF OCP (BCP): ICD-10-CM

## 2022-10-03 DIAGNOSIS — N92.0 MENORRHAGIA WITH REGULAR CYCLE: ICD-10-CM

## 2022-10-03 PROCEDURE — S0612 ANNUAL GYNECOLOGICAL EXAMINA: HCPCS | Performed by: PHYSICIAN ASSISTANT

## 2022-10-03 RX ORDER — DROSPIRENONE AND ETHINYL ESTRADIOL 0.02-3(28)
1 KIT ORAL DAILY
Qty: 90 TABLET | Refills: 1 | Status: SHIPPED | OUTPATIENT
Start: 2022-10-03

## 2022-10-03 NOTE — PROGRESS NOTES
Assessment/Plan:    No problem-specific Assessment & Plan notes found for this encounter  Diagnoses and all orders for this visit:    Encounter for well woman exam    Encounter for screening breast examination    Initiation of OCP (BCP)  -     drospirenone-ethinyl estradiol (TONY) 3-0 02 MG per tablet; Take 1 tablet by mouth daily    Menorrhagia with regular cycle  -     drospirenone-ethinyl estradiol (TONY) 3-0 02 MG per tablet; Take 1 tablet by mouth daily  -     US pelvis complete w transvaginal; Future          Subjective:      Patient ID: Jackie Caraballo is a 34 y o  female  Pt presents for her annual exam today--  She has complaints of persistent heavy bleeding, despite Lysteda  She has no pelvic pain  Interested in OCP at this time  Bowel and bladder are regular  No breast concerns today      No pap today  rx us  rx tony      The following portions of the patient's history were reviewed and updated as appropriate: allergies, current medications, past family history, past medical history, past social history, past surgical history and problem list     Review of Systems   Constitutional: Negative for chills, fever and unexpected weight change  Gastrointestinal: Negative for abdominal pain, blood in stool, constipation and diarrhea  Genitourinary: Negative  Objective:      /80   Ht 5' 6" (1 676 m)   Wt 109 kg (240 lb)   LMP 09/24/2022 (Exact Date)   BMI 38 74 kg/m²          Physical Exam  Vitals and nursing note reviewed  Constitutional:       Appearance: She is well-developed  HENT:      Head: Normocephalic and atraumatic  Chest:   Breasts:      Right: No inverted nipple, mass, nipple discharge or skin change  Left: No inverted nipple, mass, nipple discharge or skin change  Abdominal:      Palpations: Abdomen is soft  Genitourinary:     Exam position: Supine  Labia:         Right: No rash, tenderness or lesion  Left: No rash, tenderness or lesion  Vagina: Normal       Cervix: No cervical motion tenderness, discharge or friability  Adnexa:         Right: No mass, tenderness or fullness  Left: No mass, tenderness or fullness  Musculoskeletal:      Cervical back: Normal range of motion  Lymphadenopathy:      Lower Body: No right inguinal adenopathy  No left inguinal adenopathy

## 2022-10-24 ENCOUNTER — OFFICE VISIT (OUTPATIENT)
Dept: BARIATRICS | Facility: CLINIC | Age: 32
End: 2022-10-24

## 2022-10-24 DIAGNOSIS — R63.5 ABNORMAL WEIGHT GAIN: ICD-10-CM

## 2022-10-24 PROCEDURE — WEIGHT: Performed by: DIETITIAN, REGISTERED

## 2022-10-24 PROCEDURE — RECHECK: Performed by: DIETITIAN, REGISTERED

## 2023-03-05 DIAGNOSIS — Z30.011 INITIATION OF OCP (BCP): ICD-10-CM

## 2023-03-05 DIAGNOSIS — N92.0 MENORRHAGIA WITH REGULAR CYCLE: ICD-10-CM

## 2023-04-03 ENCOUNTER — APPOINTMENT (OUTPATIENT)
Dept: LAB | Facility: IMAGING CENTER | Age: 33
End: 2023-04-03

## 2023-04-03 DIAGNOSIS — E55.9 AVITAMINOSIS D: ICD-10-CM

## 2023-04-03 DIAGNOSIS — R73.9 BLOOD GLUCOSE ELEVATED: ICD-10-CM

## 2023-04-03 DIAGNOSIS — E78.5 HYPERLIPIDEMIA, UNSPECIFIED HYPERLIPIDEMIA TYPE: ICD-10-CM

## 2023-04-03 DIAGNOSIS — Z13.29 SCREENING FOR THYROID DISORDER: ICD-10-CM

## 2023-04-03 DIAGNOSIS — Z13.228 SCREENING FOR PHENYLKETONURIA (PKU): ICD-10-CM

## 2023-04-03 DIAGNOSIS — D64.9 ANEMIA, UNSPECIFIED TYPE: ICD-10-CM

## 2023-04-03 LAB
ABO GROUP BLD: NORMAL
ALBUMIN SERPL BCP-MCNC: 3.3 G/DL (ref 3.5–5)
ALP SERPL-CCNC: 55 U/L (ref 46–116)
ALT SERPL W P-5'-P-CCNC: 34 U/L (ref 12–78)
ANION GAP SERPL CALCULATED.3IONS-SCNC: 3 MMOL/L (ref 4–13)
AST SERPL W P-5'-P-CCNC: 17 U/L (ref 5–45)
BASOPHILS # BLD AUTO: 0.03 THOUSANDS/ÂΜL (ref 0–0.1)
BASOPHILS NFR BLD AUTO: 0 % (ref 0–1)
BILIRUB DIRECT SERPL-MCNC: 0.1 MG/DL (ref 0–0.2)
BILIRUB SERPL-MCNC: 0.42 MG/DL (ref 0.2–1)
BUN SERPL-MCNC: 15 MG/DL (ref 5–25)
CALCIUM ALBUM COR SERPL-MCNC: 9.7 MG/DL (ref 8.3–10.1)
CALCIUM SERPL-MCNC: 9.1 MG/DL (ref 8.3–10.1)
CHLORIDE SERPL-SCNC: 109 MMOL/L (ref 96–108)
CHOLEST SERPL-MCNC: 154 MG/DL
CO2 SERPL-SCNC: 28 MMOL/L (ref 21–32)
CREAT SERPL-MCNC: 0.81 MG/DL (ref 0.6–1.3)
EOSINOPHIL # BLD AUTO: 0.16 THOUSAND/ÂΜL (ref 0–0.61)
EOSINOPHIL NFR BLD AUTO: 2 % (ref 0–6)
ERYTHROCYTE [DISTWIDTH] IN BLOOD BY AUTOMATED COUNT: 12.6 % (ref 11.6–15.1)
EST. AVERAGE GLUCOSE BLD GHB EST-MCNC: 94 MG/DL
GFR SERPL CREATININE-BSD FRML MDRD: 95 ML/MIN/1.73SQ M
GLUCOSE P FAST SERPL-MCNC: 90 MG/DL (ref 65–99)
HBA1C MFR BLD: 4.9 %
HCT VFR BLD AUTO: 36 % (ref 34.8–46.1)
HDLC SERPL-MCNC: 56 MG/DL
HGB BLD-MCNC: 11.9 G/DL (ref 11.5–15.4)
IMM GRANULOCYTES # BLD AUTO: 0.02 THOUSAND/UL (ref 0–0.2)
IMM GRANULOCYTES NFR BLD AUTO: 0 % (ref 0–2)
LDLC SERPL CALC-MCNC: 82 MG/DL (ref 0–100)
LYMPHOCYTES # BLD AUTO: 2.16 THOUSANDS/ÂΜL (ref 0.6–4.47)
LYMPHOCYTES NFR BLD AUTO: 32 % (ref 14–44)
MCH RBC QN AUTO: 28.6 PG (ref 26.8–34.3)
MCHC RBC AUTO-ENTMCNC: 33.1 G/DL (ref 31.4–37.4)
MCV RBC AUTO: 87 FL (ref 82–98)
MONOCYTES # BLD AUTO: 0.42 THOUSAND/ÂΜL (ref 0.17–1.22)
MONOCYTES NFR BLD AUTO: 6 % (ref 4–12)
NEUTROPHILS # BLD AUTO: 3.97 THOUSANDS/ÂΜL (ref 1.85–7.62)
NEUTS SEG NFR BLD AUTO: 60 % (ref 43–75)
NONHDLC SERPL-MCNC: 98 MG/DL
NRBC BLD AUTO-RTO: 0 /100 WBCS
PLATELET # BLD AUTO: 233 THOUSANDS/UL (ref 149–390)
PMV BLD AUTO: 10.5 FL (ref 8.9–12.7)
POTASSIUM SERPL-SCNC: 4.3 MMOL/L (ref 3.5–5.3)
PROT SERPL-MCNC: 7.2 G/DL (ref 6.4–8.4)
RBC # BLD AUTO: 4.16 MILLION/UL (ref 3.81–5.12)
RH BLD: NEGATIVE
SODIUM SERPL-SCNC: 140 MMOL/L (ref 135–147)
T3FREE SERPL-MCNC: 2.14 PG/ML (ref 2.3–4.2)
T4 FREE SERPL-MCNC: 0.86 NG/DL (ref 0.76–1.46)
TRIGL SERPL-MCNC: 79 MG/DL
TSH SERPL DL<=0.05 MIU/L-ACNC: 3.15 UIU/ML (ref 0.45–4.5)
WBC # BLD AUTO: 6.76 THOUSAND/UL (ref 4.31–10.16)

## 2023-04-08 LAB
25(OH)D2 SERPL-MCNC: 2.6 NG/ML
25(OH)D3 SERPL-MCNC: 44 NG/ML
25(OH)D3+25(OH)D2 SERPL-MCNC: 47 NG/ML

## 2023-09-18 DIAGNOSIS — Z30.41 ENCOUNTER FOR SURVEILLANCE OF CONTRACEPTIVE PILLS: Primary | ICD-10-CM

## 2023-09-18 RX ORDER — DROSPIRENONE AND ETHINYL ESTRADIOL 0.02-3(28)
1 KIT ORAL DAILY
Qty: 30 TABLET | Refills: 0 | Status: SHIPPED | OUTPATIENT
Start: 2023-09-18

## 2023-10-09 ENCOUNTER — ANNUAL EXAM (OUTPATIENT)
Dept: GYNECOLOGY | Facility: CLINIC | Age: 33
End: 2023-10-09
Payer: COMMERCIAL

## 2023-10-09 VITALS
SYSTOLIC BLOOD PRESSURE: 130 MMHG | BODY MASS INDEX: 37.22 KG/M2 | HEIGHT: 66 IN | DIASTOLIC BLOOD PRESSURE: 86 MMHG | WEIGHT: 231.6 LBS

## 2023-10-09 DIAGNOSIS — Z30.41 ENCOUNTER FOR SURVEILLANCE OF CONTRACEPTIVE PILLS: ICD-10-CM

## 2023-10-09 DIAGNOSIS — Z12.39 ENCOUNTER FOR SCREENING BREAST EXAMINATION: ICD-10-CM

## 2023-10-09 DIAGNOSIS — Z01.419 ENCOUNTER FOR WELL WOMAN EXAM: Primary | ICD-10-CM

## 2023-10-09 PROCEDURE — S0612 ANNUAL GYNECOLOGICAL EXAMINA: HCPCS | Performed by: PHYSICIAN ASSISTANT

## 2023-10-09 RX ORDER — DROSPIRENONE AND ETHINYL ESTRADIOL 0.02-3(28)
1 KIT ORAL DAILY
Qty: 90 TABLET | Refills: 4 | Status: SHIPPED | OUTPATIENT
Start: 2023-10-09

## 2023-10-09 NOTE — PROGRESS NOTES
Assessment/Plan:    No problem-specific Assessment & Plan notes found for this encounter. Diagnoses and all orders for this visit:    Encounter for well woman exam    Encounter for screening breast examination    Encounter for surveillance of contraceptive pills  -     drospirenone-ethinyl estradiol (Vestura) 3-0.02 MG per tablet; Take 1 tablet by mouth daily          Subjective:      Patient ID: Myron Sheridan is a 28 y.o. female. Pt presents for her annual exam today--  She has no complaints  She has no bleeding or pelvic pain since starting SEJAL  occ spotting  No heavy bleeding or cramping  Bowel and bladder are regular  No breast concerns today    No pap today. rx sejal      The following portions of the patient's history were reviewed and updated as appropriate: allergies, current medications, past family history, past medical history, past social history, past surgical history and problem list.    Review of Systems   Constitutional: Negative for chills, fever and unexpected weight change. HENT: Negative for ear pain and sore throat. Eyes: Negative for pain and visual disturbance. Respiratory: Negative for cough and shortness of breath. Cardiovascular: Negative for chest pain and palpitations. Gastrointestinal: Negative for abdominal pain, blood in stool, constipation, diarrhea and vomiting. Genitourinary: Negative. Negative for dysuria and hematuria. Musculoskeletal: Negative for arthralgias and back pain. Skin: Negative for color change and rash. Neurological: Negative for seizures and syncope. All other systems reviewed and are negative. Objective:      /86   Ht 5' 6" (1.676 m)   Wt 105 kg (231 lb 9.6 oz)   LMP  (LMP Unknown)   BMI 37.38 kg/m²          Physical Exam  Vitals and nursing note reviewed. Constitutional:       Appearance: She is well-developed. HENT:      Head: Normocephalic and atraumatic.    Chest:   Breasts:     Right: No inverted nipple, mass, nipple discharge or skin change. Left: No inverted nipple, mass, nipple discharge or skin change. Abdominal:      Palpations: Abdomen is soft. Genitourinary:     Exam position: Supine. Labia:         Right: No rash, tenderness or lesion. Left: No rash, tenderness or lesion. Vagina: Normal.      Cervix: No cervical motion tenderness, discharge or friability. Adnexa:         Right: No mass, tenderness or fullness. Left: No mass, tenderness or fullness. Musculoskeletal:      Cervical back: Normal range of motion. Lymphadenopathy:      Lower Body: No right inguinal adenopathy. No left inguinal adenopathy.

## 2024-01-11 ENCOUNTER — TELEPHONE (OUTPATIENT)
Dept: GASTROENTEROLOGY | Facility: CLINIC | Age: 34
End: 2024-01-11

## 2024-01-11 ENCOUNTER — OFFICE VISIT (OUTPATIENT)
Dept: GASTROENTEROLOGY | Facility: CLINIC | Age: 34
End: 2024-01-11
Payer: COMMERCIAL

## 2024-01-11 VITALS
WEIGHT: 233.4 LBS | DIASTOLIC BLOOD PRESSURE: 72 MMHG | TEMPERATURE: 97.8 F | SYSTOLIC BLOOD PRESSURE: 118 MMHG | HEIGHT: 66 IN | BODY MASS INDEX: 37.51 KG/M2

## 2024-01-11 DIAGNOSIS — R19.4 CHANGE IN BOWEL HABITS: Primary | ICD-10-CM

## 2024-01-11 DIAGNOSIS — R14.0 BLOATING: ICD-10-CM

## 2024-01-11 DIAGNOSIS — R10.9 ABDOMINAL PAIN, UNSPECIFIED ABDOMINAL LOCATION: ICD-10-CM

## 2024-01-11 PROCEDURE — 99244 OFF/OP CNSLTJ NEW/EST MOD 40: CPT | Performed by: INTERNAL MEDICINE

## 2024-01-11 RX ORDER — DUPILUMAB 300 MG/2ML
INJECTION, SOLUTION SUBCUTANEOUS
COMMUNITY
Start: 2024-01-04

## 2024-01-11 NOTE — PATIENT INSTRUCTIONS
Scheduled date of colonoscopy/EGD (as of today): 03/20/2024  Physician performing colonoscopy: Dr. Augustin   Location of colonoscopy: [facility]  Bowel prep reviewed with patient: Miralax/Dulcolax  Instructions reviewed with patient by: Renee EID   Clearances:  N/A

## 2024-01-11 NOTE — H&P (VIEW-ONLY)
[facility] - Outpatient Consultation  Terrie Amaya 33 y.o. female MRN: [id number]  Encounter: [id number]          ASSESSMENT AND PLAN:      1. Change in bowel habits  - Pancreatic elastase, fecal; Future  - Fecal fat, qualitative; Future  - Giardia antigen; Future  - Calprotectin,Fecal; Future  - Colonoscopy; Future  - EGD; Future  2. Bloating  - EGD; Future  3. Abdominal pain, unspecified abdominal location  - EGD; Future    33-year-old female presenting for evaluation of roughly 8-month history of worsening generalized GI symptoms including abdominal pain, significant bloating, fluctuation in bowel habits and stool consistency.    Discussed with the patient a broad differential including but not limited to celiac disease, irritable bowel syndrome, gas bloat syndrome, inflammatory bowel disease, much less likely malignancy.  Discussed starting with noninvasive evaluation versus endoscopic evaluation alongside stool studies.  She would prefer the latter.  Will plan for bidirectional endoscopy as well as stool studies to rule out pancreatic insufficiency and Giardia.  Given propensity for loose stool/diarrhea, MiraLAX Dulcolax bowel prep is appropriate.    Plan for gastric and duodenal biopsies    Follow-up after procedures  ______________________________________________________________________    HPI: 33-year-old female presenting for evaluation of8-month history of worsening generalized GI symptoms including abdominal pain, significant bloating, fluctuation in bowel habits and stool consistency.  Prior to the onset of her symptoms she denies any changes to her medical history, medications, over-the-counter supplements, food intake.    The most alarming symptoms to her is her bloating.  Bowel habits fluctuate largely between constipation but more commonly very loose stools.  She denies any blood in the stool.  Her weight has been stable during this time.  She has had a cholecystectomy  but denies any other abdominal surgeries.    She has tried avoidance of dairy amongst other foods but this has not made any difference in her symptoms.  Review of systems otherwise negative  No prior EGD or colonoscopy.      REVIEW OF SYSTEMS:    CONSTITUTIONAL: Denies any fever, chills, rigors, and weight loss.  HEENT: Denies odynophagia, tinnitus  CARDIOVASCULAR: No chest pain or palpitations.   RESPIRATORY: Denies any cough, hemoptysis, shortness of breath or dyspnea on exertion.  GASTROINTESTINAL: As noted in the History of Present Illness.   GENITOURINARY: No problems with urination. Denies any hematuria or dysuria.  NEUROLOGIC: No dizziness or vertigo, denies headaches.   MUSCULOSKELETAL: Denies any muscle or joint pain.   SKIN: Denies skin rashes or itching.   ENDOCRINE:  Denies intolerance to heat or cold.  PSYCHOSOCIAL: Denies depression or anxiety. Denies any recent memory loss.       Historical Information   Past Medical History:   Diagnosis Date   • Allergic rhinitis     perennial on IT   • Asthma     childhood   • At risk for complication of anesthesia     h/o post anesthesia bronchospasm   • Contact dermatitis     Last Assessed [id number]. Resolved 20Oct2016.   • COVID-19 09/2021   • Epidermal inclusion cyst     Last Assessed 03Nov2014. Resolved 20Oct2016.   • Hearing loss    • Migraine    • Skin disorder    • TMJ (temporomandibular joint disorder)     needs wedge     Past Surgical History:   Procedure Laterality Date   • ADENOIDECTOMY     • CHOLECYSTECTOMY  05/25/2019   • OR LAPAROSCOPY SURG CHOLECYSTECTOMY N/A 5/24/2019    Procedure: CHOLECYSTECTOMY LAPAROSCOPIC;  Surgeon: Jayme De Leon MD;  Location:  MAIN OR;  Service: General   • OR TONSILLECTOMY PRIMARY/SECONDARY AGE 11/> N/A 1/25/2022    Procedure: TONSILLECTOMY;  Surgeon: Rolan Soto DO;  Location: [facility];  Service: ENT   • TOOTH EXTRACTION  2008   • WISDOM TOOTH EXTRACTION       Social History   Social History     Substance  "and Sexual Activity   Alcohol Use Not Currently     Social History     Substance and Sexual Activity   Drug Use No     Social History     Tobacco Use   Smoking Status Former   • Current packs/day: 0.00   • Average packs/day: 2.0 packs/day for 13.0 years (26.0 ttl pk-yrs)   • Types: Cigarettes   • Start date: 2002   • Quit date: 2015   • Years since quittin.3   Smokeless Tobacco Never     Family History   Problem Relation Age of Onset   • Depression Mother    • Diabetes Father    • Hiatal hernia Father    • Hypertension Father    • Other Paternal Grandmother         Bowel disease   • Colon cancer Other        Meds/Allergies       Current Outpatient Medications:   •  ALPRAZolam (XANAX) 0.25 mg tablet  •  Ascorbic Acid (VITAMIN C PO)  •  b complex vitamins capsule  •  Cholecalciferol (Vitamin D-3) 125 MCG (5000 UT) TABS  •  drospirenone-ethinyl estradiol (Vestura) 3-0.02 MG per tablet  •  Dupilumab (DUPIXENT SC)  •  Dupixent 300 MG/2ML SOPN  •  FLUoxetine (PROzac) 40 MG capsule  •  magnesium 30 MG tablet  •  Tuberculin-Allergy Syringes (ANTI-STICK ALLERGY SYR 1CC/27G) 27G X 3/8\" 1 ML MISC  •  Vestura 3-0.02 MG per tablet  •  betamethasone dipropionate (DIPROSONE) 0.05 % cream  •  clindamycin (CLEOCIN T) 1 % lotion  •  drospirenone-ethinyl estradiol (Vestura) 3-0.02 MG per tablet  •  EPINEPHrine (EPIPEN) 0.3 mg/0.3 mL SOAJ  •  oxyCODONE-acetaminophen (PERCOCET) 5-325 mg per tablet  •  predniSONE 20 mg tablet  •  Tranexamic Acid 650 MG TABS    Allergies   Allergen Reactions   • Biaxin [Clarithromycin] Vomiting   • Ciprofloxacin Hives   • Sulfa Antibiotics Hives   • Zithromax [Azithromycin] Vomiting           Objective     Blood pressure 118/72, temperature 97.8 °F (36.6 °C), temperature source Tympanic, height 5' 6\" (1.676 m), weight 106 kg (233 lb 6.4 oz), not currently breastfeeding. Body mass index is 37.67 kg/m².        PHYSICAL EXAM:      General Appearance:   Alert, cooperative, no distress   HEENT:   " Normocephalic, atraumatic, anicteric.     Neck:  Supple, symmetrical, trachea midline   Lungs:   Clear to auscultation bilaterally; no rales, rhonchi or wheezing; respirations unlabored    Heart::   Regular rate and rhythm; no murmur, rub, or gallop.   Abdomen:   Soft, non-tender, non-distended; normal bowel sounds; no masses, no organomegaly    Genitalia:   Deferred    Rectal:   Deferred    Extremities:  No cyanosis, clubbing or edema    Pulses:  2+ and symmetric    Skin:  No jaundice, rashes, or lesions          Lab Results:   No visits with results within 1 Day(s) from this visit.   Latest known visit with results is:   Appointment on 04/03/2023   Component Date Value   • WBC 04/03/2023 6.76    • RBC 04/03/2023 4.16    • Hemoglobin 04/03/2023 11.9    • Hematocrit 04/03/2023 36.0    • MCV 04/03/2023 87    • MCH 04/03/2023 28.6    • MCHC 04/03/2023 33.1    • RDW 04/03/2023 12.6    • MPV 04/03/2023 10.5    • Platelets 04/03/2023 233    • nRBC 04/03/2023 0    • Neutrophils Relative 04/03/2023 60    • Immat GRANS % 04/03/2023 0    • Lymphocytes Relative 04/03/2023 32    • Monocytes Relative 04/03/2023 6    • Eosinophils Relative 04/03/2023 2    • Basophils Relative 04/03/2023 0    • Neutrophils Absolute 04/03/2023 3.97    • Immature Grans Absolute 04/03/2023 0.02    • Lymphocytes Absolute 04/03/2023 2.16    • Monocytes Absolute 04/03/2023 0.42    • Eosinophils Absolute 04/03/2023 0.16    • Basophils Absolute 04/03/2023 0.03    • Sodium 04/03/2023 140    • Potassium 04/03/2023 4.3    • Chloride 04/03/2023 109 (H)    • CO2 04/03/2023 28    • ANION GAP 04/03/2023 3 (L)    • BUN 04/03/2023 15    • Creatinine 04/03/2023 0.81    • Glucose, Fasting 04/03/2023 90    • Calcium 04/03/2023 9.1    • Corrected Calcium 04/03/2023 9.7    • AST 04/03/2023 17    • ALT 04/03/2023 34    • Alkaline Phosphatase 04/03/2023 55    • Total Protein 04/03/2023 7.2    • Albumin 04/03/2023 3.3 (L)    • Total Bilirubin 04/03/2023 0.42    • eGFR  04/03/2023 95    • Hemoglobin A1C 04/03/2023 4.9    • EAG 04/03/2023 94    • Cholesterol 04/03/2023 154    • Triglycerides 04/03/2023 79    • HDL, Direct 04/03/2023 56    • LDL Calculated 04/03/2023 82    • Non-HDL-Chol (CHOL-HDL) 04/03/2023 98    • ABO Grouping 04/03/2023 A    • Rh Factor 04/03/2023 Negative    • TSH 3RD GENERATON 04/03/2023 3.150    • T3, Free 04/03/2023 2.14 (L)    • Free T4 04/03/2023 0.86    • 25-HYDROXY VIT D 04/03/2023 47    • 25-Hydroxy D2 04/03/2023 2.6    • 25-HYDROXY VIT D3 04/03/2023 44    • Bilirubin, Direct 04/03/2023 0.10          Radiology Results:   No results found.  Answers submitted by the patient for this visit:  Abdominal Pain Questionnaire (Submitted on 1/4/2024)  Chief Complaint: Abdominal pain  Chronicity: new  Onset: more than 1 month ago  Onset quality: sudden  Frequency: intermittently  Episode duration: 1 Hours  Progression since onset: unchanged  Pain location: generalized abdominal region  Pain - numeric: 2/10  Pain quality: burning, cramping, a sensation of fullness  Radiates to: does not radiate  anorexia: Yes  belching: Yes  constipation: Yes  diarrhea: Yes  flatus: Yes  frequency: Yes  myalgias: Yes  Aggravated by: eating  Relieved by: belching, passing flatus, recumbency

## 2024-01-11 NOTE — PROGRESS NOTES
[facility] - Outpatient Consultation  Terrie Amaya 33 y.o. female MRN: [id number]  Encounter: [id number]          ASSESSMENT AND PLAN:      1. Change in bowel habits  - Pancreatic elastase, fecal; Future  - Fecal fat, qualitative; Future  - Giardia antigen; Future  - Calprotectin,Fecal; Future  - Colonoscopy; Future  - EGD; Future  2. Bloating  - EGD; Future  3. Abdominal pain, unspecified abdominal location  - EGD; Future    33-year-old female presenting for evaluation of roughly 8-month history of worsening generalized GI symptoms including abdominal pain, significant bloating, fluctuation in bowel habits and stool consistency.    Discussed with the patient a broad differential including but not limited to celiac disease, irritable bowel syndrome, gas bloat syndrome, inflammatory bowel disease, much less likely malignancy.  Discussed starting with noninvasive evaluation versus endoscopic evaluation alongside stool studies.  She would prefer the latter.  Will plan for bidirectional endoscopy as well as stool studies to rule out pancreatic insufficiency and Giardia.  Given propensity for loose stool/diarrhea, MiraLAX Dulcolax bowel prep is appropriate.    Plan for gastric and duodenal biopsies    Follow-up after procedures  ______________________________________________________________________    HPI: 33-year-old female presenting for evaluation of8-month history of worsening generalized GI symptoms including abdominal pain, significant bloating, fluctuation in bowel habits and stool consistency.  Prior to the onset of her symptoms she denies any changes to her medical history, medications, over-the-counter supplements, food intake.    The most alarming symptoms to her is her bloating.  Bowel habits fluctuate largely between constipation but more commonly very loose stools.  She denies any blood in the stool.  Her weight has been stable during this time.  She has had a cholecystectomy  but denies any other abdominal surgeries.    She has tried avoidance of dairy amongst other foods but this has not made any difference in her symptoms.  Review of systems otherwise negative  No prior EGD or colonoscopy.      REVIEW OF SYSTEMS:    CONSTITUTIONAL: Denies any fever, chills, rigors, and weight loss.  HEENT: Denies odynophagia, tinnitus  CARDIOVASCULAR: No chest pain or palpitations.   RESPIRATORY: Denies any cough, hemoptysis, shortness of breath or dyspnea on exertion.  GASTROINTESTINAL: As noted in the History of Present Illness.   GENITOURINARY: No problems with urination. Denies any hematuria or dysuria.  NEUROLOGIC: No dizziness or vertigo, denies headaches.   MUSCULOSKELETAL: Denies any muscle or joint pain.   SKIN: Denies skin rashes or itching.   ENDOCRINE:  Denies intolerance to heat or cold.  PSYCHOSOCIAL: Denies depression or anxiety. Denies any recent memory loss.       Historical Information   Past Medical History:   Diagnosis Date   • Allergic rhinitis     perennial on IT   • Asthma     childhood   • At risk for complication of anesthesia     h/o post anesthesia bronchospasm   • Contact dermatitis     Last Assessed [id number]. Resolved 20Oct2016.   • COVID-19 09/2021   • Epidermal inclusion cyst     Last Assessed 03Nov2014. Resolved 20Oct2016.   • Hearing loss    • Migraine    • Skin disorder    • TMJ (temporomandibular joint disorder)     needs wedge     Past Surgical History:   Procedure Laterality Date   • ADENOIDECTOMY     • CHOLECYSTECTOMY  05/25/2019   • MA LAPAROSCOPY SURG CHOLECYSTECTOMY N/A 5/24/2019    Procedure: CHOLECYSTECTOMY LAPAROSCOPIC;  Surgeon: Jayme De Leon MD;  Location:  MAIN OR;  Service: General   • MA TONSILLECTOMY PRIMARY/SECONDARY AGE 11/> N/A 1/25/2022    Procedure: TONSILLECTOMY;  Surgeon: Rolan Soto DO;  Location: [facility];  Service: ENT   • TOOTH EXTRACTION  2008   • WISDOM TOOTH EXTRACTION       Social History   Social History     Substance  "and Sexual Activity   Alcohol Use Not Currently     Social History     Substance and Sexual Activity   Drug Use No     Social History     Tobacco Use   Smoking Status Former   • Current packs/day: 0.00   • Average packs/day: 2.0 packs/day for 13.0 years (26.0 ttl pk-yrs)   • Types: Cigarettes   • Start date: 2002   • Quit date: 2015   • Years since quittin.3   Smokeless Tobacco Never     Family History   Problem Relation Age of Onset   • Depression Mother    • Diabetes Father    • Hiatal hernia Father    • Hypertension Father    • Other Paternal Grandmother         Bowel disease   • Colon cancer Other        Meds/Allergies       Current Outpatient Medications:   •  ALPRAZolam (XANAX) 0.25 mg tablet  •  Ascorbic Acid (VITAMIN C PO)  •  b complex vitamins capsule  •  Cholecalciferol (Vitamin D-3) 125 MCG (5000 UT) TABS  •  drospirenone-ethinyl estradiol (Vestura) 3-0.02 MG per tablet  •  Dupilumab (DUPIXENT SC)  •  Dupixent 300 MG/2ML SOPN  •  FLUoxetine (PROzac) 40 MG capsule  •  magnesium 30 MG tablet  •  Tuberculin-Allergy Syringes (ANTI-STICK ALLERGY SYR 1CC/27G) 27G X 3/8\" 1 ML MISC  •  Vestura 3-0.02 MG per tablet  •  betamethasone dipropionate (DIPROSONE) 0.05 % cream  •  clindamycin (CLEOCIN T) 1 % lotion  •  drospirenone-ethinyl estradiol (Vestura) 3-0.02 MG per tablet  •  EPINEPHrine (EPIPEN) 0.3 mg/0.3 mL SOAJ  •  oxyCODONE-acetaminophen (PERCOCET) 5-325 mg per tablet  •  predniSONE 20 mg tablet  •  Tranexamic Acid 650 MG TABS    Allergies   Allergen Reactions   • Biaxin [Clarithromycin] Vomiting   • Ciprofloxacin Hives   • Sulfa Antibiotics Hives   • Zithromax [Azithromycin] Vomiting           Objective     Blood pressure 118/72, temperature 97.8 °F (36.6 °C), temperature source Tympanic, height 5' 6\" (1.676 m), weight 106 kg (233 lb 6.4 oz), not currently breastfeeding. Body mass index is 37.67 kg/m².        PHYSICAL EXAM:      General Appearance:   Alert, cooperative, no distress   HEENT:   " Normocephalic, atraumatic, anicteric.     Neck:  Supple, symmetrical, trachea midline   Lungs:   Clear to auscultation bilaterally; no rales, rhonchi or wheezing; respirations unlabored    Heart::   Regular rate and rhythm; no murmur, rub, or gallop.   Abdomen:   Soft, non-tender, non-distended; normal bowel sounds; no masses, no organomegaly    Genitalia:   Deferred    Rectal:   Deferred    Extremities:  No cyanosis, clubbing or edema    Pulses:  2+ and symmetric    Skin:  No jaundice, rashes, or lesions          Lab Results:   No visits with results within 1 Day(s) from this visit.   Latest known visit with results is:   Appointment on 04/03/2023   Component Date Value   • WBC 04/03/2023 6.76    • RBC 04/03/2023 4.16    • Hemoglobin 04/03/2023 11.9    • Hematocrit 04/03/2023 36.0    • MCV 04/03/2023 87    • MCH 04/03/2023 28.6    • MCHC 04/03/2023 33.1    • RDW 04/03/2023 12.6    • MPV 04/03/2023 10.5    • Platelets 04/03/2023 233    • nRBC 04/03/2023 0    • Neutrophils Relative 04/03/2023 60    • Immat GRANS % 04/03/2023 0    • Lymphocytes Relative 04/03/2023 32    • Monocytes Relative 04/03/2023 6    • Eosinophils Relative 04/03/2023 2    • Basophils Relative 04/03/2023 0    • Neutrophils Absolute 04/03/2023 3.97    • Immature Grans Absolute 04/03/2023 0.02    • Lymphocytes Absolute 04/03/2023 2.16    • Monocytes Absolute 04/03/2023 0.42    • Eosinophils Absolute 04/03/2023 0.16    • Basophils Absolute 04/03/2023 0.03    • Sodium 04/03/2023 140    • Potassium 04/03/2023 4.3    • Chloride 04/03/2023 109 (H)    • CO2 04/03/2023 28    • ANION GAP 04/03/2023 3 (L)    • BUN 04/03/2023 15    • Creatinine 04/03/2023 0.81    • Glucose, Fasting 04/03/2023 90    • Calcium 04/03/2023 9.1    • Corrected Calcium 04/03/2023 9.7    • AST 04/03/2023 17    • ALT 04/03/2023 34    • Alkaline Phosphatase 04/03/2023 55    • Total Protein 04/03/2023 7.2    • Albumin 04/03/2023 3.3 (L)    • Total Bilirubin 04/03/2023 0.42    • eGFR  04/03/2023 95    • Hemoglobin A1C 04/03/2023 4.9    • EAG 04/03/2023 94    • Cholesterol 04/03/2023 154    • Triglycerides 04/03/2023 79    • HDL, Direct 04/03/2023 56    • LDL Calculated 04/03/2023 82    • Non-HDL-Chol (CHOL-HDL) 04/03/2023 98    • ABO Grouping 04/03/2023 A    • Rh Factor 04/03/2023 Negative    • TSH 3RD GENERATON 04/03/2023 3.150    • T3, Free 04/03/2023 2.14 (L)    • Free T4 04/03/2023 0.86    • 25-HYDROXY VIT D 04/03/2023 47    • 25-Hydroxy D2 04/03/2023 2.6    • 25-HYDROXY VIT D3 04/03/2023 44    • Bilirubin, Direct 04/03/2023 0.10          Radiology Results:   No results found.  Answers submitted by the patient for this visit:  Abdominal Pain Questionnaire (Submitted on 1/4/2024)  Chief Complaint: Abdominal pain  Chronicity: new  Onset: more than 1 month ago  Onset quality: sudden  Frequency: intermittently  Episode duration: 1 Hours  Progression since onset: unchanged  Pain location: generalized abdominal region  Pain - numeric: 2/10  Pain quality: burning, cramping, a sensation of fullness  Radiates to: does not radiate  anorexia: Yes  belching: Yes  constipation: Yes  diarrhea: Yes  flatus: Yes  frequency: Yes  myalgias: Yes  Aggravated by: eating  Relieved by: belching, passing flatus, recumbency

## 2024-01-12 NOTE — TELEPHONE ENCOUNTER
Pt rescheduled to [facility] for her upcoming procedures with Dr. Augustin. Pt is now scheduled for 2/7/24.

## 2024-01-22 ENCOUNTER — APPOINTMENT (OUTPATIENT)
Dept: LAB | Facility: IMAGING CENTER | Age: 34
End: 2024-01-22

## 2024-01-23 ENCOUNTER — APPOINTMENT (OUTPATIENT)
Dept: LAB | Facility: IMAGING CENTER | Age: 34
End: 2024-01-23
Payer: COMMERCIAL

## 2024-01-23 DIAGNOSIS — R19.4 CHANGE IN BOWEL HABITS: ICD-10-CM

## 2024-01-23 PROCEDURE — 83993 ASSAY FOR CALPROTECTIN FECAL: CPT

## 2024-01-23 PROCEDURE — 87329 GIARDIA AG IA: CPT

## 2024-01-23 PROCEDURE — 82705 FATS/LIPIDS FECES QUAL: CPT

## 2024-01-23 PROCEDURE — 82653 EL-1 FECAL QUANTITATIVE: CPT

## 2024-01-24 LAB — G LAMBLIA AG STL QL IA: NEGATIVE

## 2024-01-25 LAB
FAT STL QL: NORMAL
NEUTRAL FAT STL QL: NORMAL

## 2024-01-26 LAB
CALPROTECTIN STL-MCNT: 12 UG/G (ref 0–120)
ELASTASE PANC STL-MCNT: 336 UG ELAST./G

## 2024-02-07 ENCOUNTER — TELEPHONE (OUTPATIENT)
Dept: GASTROENTEROLOGY | Facility: CLINIC | Age: 34
End: 2024-02-07

## 2024-02-07 ENCOUNTER — ANESTHESIA (OUTPATIENT)
Dept: GASTROENTEROLOGY | Facility: HOSPITAL | Age: 34
End: 2024-02-07

## 2024-02-07 ENCOUNTER — HOSPITAL ENCOUNTER (OUTPATIENT)
Dept: GASTROENTEROLOGY | Facility: HOSPITAL | Age: 34
Setting detail: OUTPATIENT SURGERY
Discharge: HOME | End: 2024-02-07
Attending: INTERNAL MEDICINE | Admitting: INTERNAL MEDICINE
Payer: COMMERCIAL

## 2024-02-07 ENCOUNTER — ANESTHESIA EVENT (OUTPATIENT)
Dept: GASTROENTEROLOGY | Facility: HOSPITAL | Age: 34
End: 2024-02-07

## 2024-02-07 VITALS
TEMPERATURE: 96.8 F | DIASTOLIC BLOOD PRESSURE: 72 MMHG | OXYGEN SATURATION: 98 % | SYSTOLIC BLOOD PRESSURE: 126 MMHG | HEART RATE: 72 BPM | WEIGHT: 224 LBS | BODY MASS INDEX: 36 KG/M2 | HEIGHT: 66 IN | RESPIRATION RATE: 18 BRPM

## 2024-02-07 DIAGNOSIS — R19.4 CHANGE IN BOWEL HABITS: ICD-10-CM

## 2024-02-07 DIAGNOSIS — R14.0 BLOATING: ICD-10-CM

## 2024-02-07 DIAGNOSIS — R10.9 ABDOMINAL PAIN, UNSPECIFIED ABDOMINAL LOCATION: ICD-10-CM

## 2024-02-07 PROCEDURE — 88305 TISSUE EXAM BY PATHOLOGIST: CPT | Performed by: PATHOLOGY

## 2024-02-07 PROCEDURE — 43239 EGD BIOPSY SINGLE/MULTIPLE: CPT | Performed by: INTERNAL MEDICINE

## 2024-02-07 PROCEDURE — 45380 COLONOSCOPY AND BIOPSY: CPT | Performed by: INTERNAL MEDICINE

## 2024-02-07 RX ORDER — PROPOFOL 10 MG/ML
INJECTION, EMULSION INTRAVENOUS AS NEEDED
Status: DISCONTINUED | OUTPATIENT
Start: 2024-02-07 | End: 2024-02-07

## 2024-02-07 RX ORDER — PROPOFOL 10 MG/ML
INJECTION, EMULSION INTRAVENOUS CONTINUOUS PRN
Status: DISCONTINUED | OUTPATIENT
Start: 2024-02-07 | End: 2024-02-07

## 2024-02-07 RX ORDER — SODIUM CHLORIDE, SODIUM LACTATE, POTASSIUM CHLORIDE, CALCIUM CHLORIDE 600; 310; 30; 20 MG/100ML; MG/100ML; MG/100ML; MG/100ML
INJECTION, SOLUTION INTRAVENOUS CONTINUOUS PRN
Status: DISCONTINUED | OUTPATIENT
Start: 2024-02-07 | End: 2024-02-07

## 2024-02-07 RX ORDER — GLYCOPYRROLATE 0.2 MG/ML
INJECTION INTRAMUSCULAR; INTRAVENOUS AS NEEDED
Status: DISCONTINUED | OUTPATIENT
Start: 2024-02-07 | End: 2024-02-07

## 2024-02-07 RX ADMIN — PROPOFOL 150 MG: 10 INJECTION, EMULSION INTRAVENOUS at 13:24

## 2024-02-07 RX ADMIN — GLYCOPYRROLATE 0.2 MG: 0.2 INJECTION INTRAMUSCULAR; INTRAVENOUS at 13:24

## 2024-02-07 RX ADMIN — SODIUM CHLORIDE, SODIUM LACTATE, POTASSIUM CHLORIDE, AND CALCIUM CHLORIDE: .6; .31; .03; .02 INJECTION, SOLUTION INTRAVENOUS at 13:20

## 2024-02-07 RX ADMIN — PROPOFOL 150 MCG/KG/MIN: 10 INJECTION, EMULSION INTRAVENOUS at 13:24

## 2024-02-07 NOTE — INTERVAL H&P NOTE
H&P reviewed. After examining the patient I find no changes in the patients condition since the H&P had been written.    Vitals:    02/07/24 1205   BP: 130/82   Pulse: 64   Resp: 16   Temp: (!) 96.8 °F (36 °C)   SpO2: 98%

## 2024-02-07 NOTE — ANESTHESIA PREPROCEDURE EVALUATION
Procedure:  COLONOSCOPY  EGD    Relevant Problems   NEURO/PSYCH   (+) Anxiety        Physical Exam    Airway    Mallampati score: II  TM Distance: >3 FB  Neck ROM: full     Dental   No notable dental hx     Cardiovascular  Cardiovascular exam normal    Pulmonary  Pulmonary exam normal     Other Findings  post-pubertal.    Patient declines pregnancy test. Risks of undergoing anesthesia with unknown pregnancy status explained, patient endorsed understanding.   Anesthesia Plan  ASA Score- 2     Anesthesia Type- IV sedation with anesthesia with ASA Monitors.         Additional Monitors:     Airway Plan:            Plan Factors-Exercise tolerance (METS): >4 METS.    Chart reviewed.    Patient summary reviewed.    Patient is not a current smoker.              Induction- intravenous.    Postoperative Plan-     Informed Consent- Anesthetic plan and risks discussed with patient.

## 2024-02-07 NOTE — ANESTHESIA POSTPROCEDURE EVALUATION
Post-Op Assessment Note    CV Status:  Stable  Pain Score: 0    Pain management: adequate       Mental Status:  Alert and awake   Hydration Status:  Euvolemic   PONV Controlled:  Controlled   Airway Patency:  Patent     Post Op Vitals Reviewed: Yes    No anethesia notable event occurred.    Staff: CRNA               BP   128/56   Temp 98   Pulse 78   Resp 12   SpO2 99

## 2024-02-07 NOTE — TELEPHONE ENCOUNTER
----- Message from Negrito Ann sent at 2/7/2024  2:38 PM EST -----    ----- Message -----  From: Jaqui Augustin DO  Sent: 2/7/2024   2:21 PM EST  To: #    Patient has appointment with me in April but requesting earlier, do I have anything available? If not can she do a virtual if I have availability at [facility]? Thanks!

## 2024-02-09 PROCEDURE — 88305 TISSUE EXAM BY PATHOLOGIST: CPT | Performed by: PATHOLOGY

## 2024-02-20 ENCOUNTER — TELEMEDICINE (OUTPATIENT)
Age: 34
End: 2024-02-20
Payer: COMMERCIAL

## 2024-02-20 VITALS — OXYGEN SATURATION: 98 %

## 2024-02-20 DIAGNOSIS — K90.41 NON-CELIAC GLUTEN SENSITIVITY: ICD-10-CM

## 2024-02-20 DIAGNOSIS — R14.0 BLOATING: ICD-10-CM

## 2024-02-20 DIAGNOSIS — R19.4 CHANGE IN BOWEL HABITS: Primary | ICD-10-CM

## 2024-02-20 PROCEDURE — 99214 OFFICE O/P EST MOD 30 MIN: CPT | Performed by: INTERNAL MEDICINE

## 2024-02-20 RX ORDER — FLUOXETINE HYDROCHLORIDE 60 MG/1
60 TABLET, FILM COATED ORAL; ORAL DAILY
COMMUNITY
Start: 2024-01-23

## 2024-02-20 NOTE — PROGRESS NOTES
[facility] - Outpatient Consultation  Terrie Amaya 33 y.o. female MRN: [id number]  Encounter: [id number]          ASSESSMENT AND PLAN:      1. Change in bowel habits  2. Bloating  3. Non-celiac gluten sensitivity    33-year-old female presenting for follow-up after recent EGD and colonoscopy completed 4 fluctuating bowel habits, bloating, change in stool consistency.    Endoscopically exams were unremarkable as well as previous stool studies.  Given significant improvement with gluten elimination, we discussed the likelihood of nonceliac gluten sensitivity as a main contributor to her symptoms.  I have encouraged continued reduction/avoidance of gluten to help with ongoing resolution of her complaints.  If her symptoms return despite significant gluten elimination, would likely refer to dietitian for structured means of elimination of food triggers.  In addition in the future she may benefit from testing for SIBO versus empiric treatment with rifaximin.  Could also consider a trial of Questran as she is postcholecystectomy if diarrhea is a predominant ongoing complaint  She will follow-up on an as-needed basis    ______________________________________________________________________    HPI:  Telemedicine consent    Patient: Terrie Amaya  Provider: Jaqui Augustin DO  Provider located at [facility] [place], [place] [facility] [address] [address] [phone]    The patient was identified by name and date of birth. Terrie Amaya was informed that this is a telemedicine visit and that the visit is being conducted through the Epic Embedded platform. She agrees to proceed..  My office door was closed. No one else was in the room.  She acknowledged consent and understanding of privacy and security of the video platform. The patient has agreed to participate and understands they can discontinue the visit at any  time.    Patient is aware this is a billable service.     I spent 9 minutes with the patient during this visit.     33-year-old female presenting for follow-up after EGD/colonoscopy that was completed for the evaluation of 8-month history of worsening generalized GI symptoms including abdominal pain, significant bloating, fluctuation in bowel habits and stool consistency.    Since her procedure she completely cut out gluten which she feels has significantly improved her symptoms.  She feels comfortable moving forward with just avoiding gluten as this has remarkably improved to the degree of bloating that used to be a daily occurrence.  She has not avoiding any other food products at this time.    EGD and colonoscopy completed on 2/7/2024 were overall unremarkable.  Biopsies were negative for celiac disease, H. pylori, microscopic colitis.  Summary of HPI from 1/11/2024-  Prior to the onset of her symptoms she denies any changes to her medical history, medications, over-the-counter supplements, food intake.  The most alarming symptoms to her is her bloating.  Bowel habits fluctuate largely between constipation but more commonly very loose stools.  She denies any blood in the stool.  Her weight has been stable during this time.  She has had a cholecystectomy but denies any other abdominal surgeries.  She has tried avoidance of dairy amongst other foods but this has not made any difference in her symptoms.  Review of systems otherwise negative  No prior EGD or colonoscopy.    REVIEW OF SYSTEMS:    CONSTITUTIONAL: Denies any fever, chills, rigors, and weight loss.  HEENT: Denies odynophagia, tinnitus  CARDIOVASCULAR: No chest pain or palpitations.   RESPIRATORY: Denies any cough, hemoptysis, shortness of breath or dyspnea on exertion.  GASTROINTESTINAL: As noted in the History of Present Illness.   GENITOURINARY: No problems with urination. Denies any hematuria or dysuria.  NEUROLOGIC: No dizziness or vertigo, denies  headaches.   MUSCULOSKELETAL: Denies any muscle or joint pain.   SKIN: Denies skin rashes or itching.   ENDOCRINE:  Denies intolerance to heat or cold.  PSYCHOSOCIAL: Denies depression or anxiety. Denies any recent memory loss.       Historical Information   Past Medical History:   Diagnosis Date   • Allergic rhinitis     perennial on IT   • Asthma     childhood   • At risk for complication of anesthesia     h/o post anesthesia bronchospasm   • Contact dermatitis     Last Assessed [id number]. Resolved 2016.   • COVID-19 2021   • Epidermal inclusion cyst     Last Assessed 2014. Resolved 2016.   • Hearing loss    • Migraine    • PCOS (polycystic ovarian syndrome)    • Skin disorder    • TMJ (temporomandibular joint disorder)     needs wedge     Past Surgical History:   Procedure Laterality Date   • ADENOIDECTOMY     • CHOLECYSTECTOMY  2019   • ID LAPAROSCOPY SURG CHOLECYSTECTOMY N/A 2019    Procedure: CHOLECYSTECTOMY LAPAROSCOPIC;  Surgeon: Jayme De Leon MD;  Location:  MAIN OR;  Service: General   • ID TONSILLECTOMY PRIMARY/SECONDARY AGE 11/> N/A 2022    Procedure: TONSILLECTOMY;  Surgeon: Rolan Soto DO;  Location: [facility];  Service: ENT   • TOOTH EXTRACTION     • WISDOM TOOTH EXTRACTION       Social History   Social History     Substance and Sexual Activity   Alcohol Use Not Currently     Social History     Substance and Sexual Activity   Drug Use No     Social History     Tobacco Use   Smoking Status Former   • Current packs/day: 0.00   • Average packs/day: 2.0 packs/day for 13.0 years (26.0 ttl pk-yrs)   • Types: Cigarettes   • Start date: 2002   • Quit date: 2015   • Years since quittin.5   Smokeless Tobacco Never     Family History   Problem Relation Age of Onset   • Depression Mother    • Diabetes Father    • Hiatal hernia Father    • Hypertension Father    • Other Paternal Grandmother         Bowel disease   • Colon cancer Other   "      Meds/Allergies       Current Outpatient Medications:   •  Allergy Syringe 27G X 3/8\" 1 ML MISC  •  ALPRAZolam (XANAX) 0.25 mg tablet  •  Ascorbic Acid (VITAMIN C PO)  •  b complex vitamins capsule  •  Cholecalciferol (Vitamin D-3) 125 MCG (5000 UT) TABS  •  Dupixent 300 MG/2ML SOPN  •  FLUoxetine (PROzac) 60 MG TABS  •  magnesium 30 MG tablet  •  predniSONE 20 mg tablet  •  Vestura 3-0.02 MG per tablet  •  EPINEPHrine (EPIPEN) 0.3 mg/0.3 mL SOAJ  •  FLUoxetine (PROzac) 40 MG capsule    Allergies   Allergen Reactions   • Biaxin [Clarithromycin] Vomiting   • Ciprofloxacin Hives   • Sulfa Antibiotics Hives   • Zithromax [Azithromycin] Vomiting           Objective     SpO2 98%, not currently breastfeeding. There is no height or weight on file to calculate BMI.        PHYSICAL EXAM:      General Appearance:   Alert, cooperative, no distress   Otherwise Limited, virtual visit                                              Lab Results:   No visits with results within 1 Day(s) from this visit.   Latest known visit with results is:   Hospital Outpatient Visit on 02/07/2024   Component Date Value   • Case Report 02/07/2024                      Value:Surgical Pathology Report                         Case: [id number]                                  Authorizing Provider:  Jaqui Augustin DO    Collected:           02/07/2024 1325              Ordering Location:     [facility] Received:            02/07/2024 1513                                     Endoscopy                                                                    Pathologist:           Trev Phan DO                                                            Specimens:   A) - Duodenum, BX, R/O CELIAC                                                                       B) - Stomach, BX, R/O H PYLORI                                                                      C) - Colon, RANDOM COLON BX, R/O MICROSCOPIC COLITIS                       "                • Final Diagnosis 02/07/2024                      Value:This result contains rich text formatting which cannot be displayed here.   • Additional Information 02/07/2024                      Value:This result contains rich text formatting which cannot be displayed here.   • Gross Description 02/07/2024                      Value:This result contains rich text formatting which cannot be displayed here.         Radiology Results:   Colonoscopy    Result Date: 2/7/2024  Narrative: Table formatting from the original result was not included. [facility] [address] [place] [address] [phone] DATE OF SERVICE: 2/07/24 PHYSICIAN(S): Attending: Jaqui Augustin DO Fellow: No Staff Documented INDICATION: Change in bowel habits POST-OP DIAGNOSIS: See the impression below. HISTORY: Prior colonoscopy: No prior colonoscopy. BOWEL PREPARATION: Miralax/Dulcolax PREPROCEDURE: Informed consent was obtained for the procedure, including sedation. Risks including but not limited to bleeding, infection, perforation, adverse drug reaction and aspiration were explained in detail. Also explained about less than 100% sensitivity with the exam and other alternatives. The patient was placed in the left lateral decubitus position. Procedure: Colonoscopy DETAILS OF PROCEDURE: Patient was taken to the procedure room where a time out was performed to confirm correct patient and correct procedure. The patient underwent monitored anesthesia care, which was administered by an anesthesia professional. The patient's blood pressure, heart rate, level of consciousness, oxygen, respirations and ECG were monitored throughout the procedure. A digital rectal exam was performed. A perianal exam was performed. The scope was introduced through the anus and advanced to the cecum. Retroflexion was performed in the rectum. The quality of bowel preparation was evaluated using the [place] Bowel Preparation  Scale with scores of: right colon = 2, transverse colon = 2, left colon = 2. The total BBPS score was 6. Bowel prep was adequate. The patient experienced no blood loss. The procedure was not difficult. The patient tolerated the procedure well. There were no apparent adverse events. ANESTHESIA INFORMATION: ASA: II Anesthesia Type: Anesthesia type not filed in the log. MEDICATIONS: No administrations occurring from 1312 to 1353 on 02/07/24 FINDINGS: All observed locations appeared normal. Performed random biopsy using biopsy forceps. EVENTS: Procedure Events Event Event Time ENDO SCOPE OUT TIME 2/7/2024  1:31 PM ENDO CECUM REACHED 2/7/2024  1:38 PM ENDO SCOPE OUT TIME 2/7/2024  1:52 PM SPECIMENS: ID Type Source Tests Collected by Time Destination 1 : BX, R/O CELIAC Tissue Duodenum TISSUE EXAM Severiano Crum MD 2/7/2024  1:25 PM  2 : BX, R/O H PYLORI Tissue Stomach TISSUE EXAM Severiano Crum MD 2/7/2024  1:26 PM  3 : RANDOM COLON BX, R/O MICROSCOPIC COLITIS Tissue Colon TISSUE EXAM Severiano Crum MD 2/7/2024  1:44 PM  EQUIPMENT: Colonoscope -     Impression: Normal colonic mucosa observed including TI. Performed random biopsy of right and left colon using biopsy forceps. RECOMMENDATION:  Await pathology results  No further screening colonoscopies necessary  Not recommended at this time due to age (patient below screening age).  Plan for age-appropriate colorectal cancer screening.    Jaqui Augustin DO     EGD    Result Date: 2/7/2024  Narrative: Table formatting from the original result was not included. [facility] [address] [place] [address] [phone] DATE OF SERVICE: 2/07/24 PHYSICIAN(S): Attending: Jaqui Augustin DO Fellow: No Staff Documented INDICATION: Bloating, Change in bowel habits, Abdominal pain, unspecified abdominal location POST-OP DIAGNOSIS: See the impression below. PREPROCEDURE: Informed consent was obtained for the procedure, including  sedation.  Risks of perforation, hemorrhage, adverse drug reaction and aspiration were discussed. The patient was placed in the left lateral decubitus position. Patient was explained about the risks and benefits of the procedure. Risks including but not limited to bleeding, infection, and perforation were explained in detail. Also explained about less than 100% sensitivity with the exam and other alternatives. PROCEDURE: EGD DETAILS OF PROCEDURE: Patient was taken to the procedure room where a time out was performed to confirm correct patient and correct procedure. The patient underwent monitored anesthesia care, which was administered by an anesthesia professional. The patient's blood pressure, heart rate, level of consciousness, respirations and oxygen were monitored throughout the procedure. The scope was introduced through the mouth and advanced to the second part of the duodenum. Retroflexion was performed in the fundus. Prior to the procedure, the patient's H. Pylori status was unknown. The patient experienced no blood loss. The procedure was not difficult. The patient tolerated the procedure well. There were no apparent adverse events. ANESTHESIA INFORMATION: ASA: II Anesthesia Type: Anesthesia type not filed in the log. MEDICATIONS: No administrations occurring from 1312 to 1331 on 02/07/24 FINDINGS: Regular Z-line 38 cm from the incisors All observed locations appeared normal. Performed random biopsy using biopsy forceps to rule out celiac disease and H. pylori. SPECIMENS: ID Type Source Tests Collected by Time Destination 1 : BX, R/O CELIAC Tissue Duodenum TISSUE EXAM Severiano Crum MD 2/7/2024  1:25 PM  2 : BX, R/O H PYLORI Tissue Stomach TISSUE EXAM Severiano Crum MD 2/7/2024  1:26 PM      Impression: All observed mucosa appeared normal. Performed random biopsy to rule out celiac disease and H. pylori. RECOMMENDATION:  Await pathology results  Follow up with me in clinic  Proceed to colonoscopy     Jaqui Augustin, DO

## 2024-07-26 DIAGNOSIS — N92.0 MENORRHAGIA WITH REGULAR CYCLE: ICD-10-CM

## 2024-07-26 DIAGNOSIS — Z30.011 INITIATION OF OCP (BCP): ICD-10-CM

## 2024-07-26 RX ORDER — DROSPIRENONE AND ETHINYL ESTRADIOL 0.02-3(28)
1 KIT ORAL DAILY
Qty: 28 TABLET | Refills: 4 | Status: SHIPPED | OUTPATIENT
Start: 2024-07-26

## 2024-07-26 NOTE — TELEPHONE ENCOUNTER
Reason for call:   [x] Refill   [] Prior Auth  [x] Other: Patient is leaving for vacation on 7/29/2024 and would like to  medication earlier as she will be away    Office:   [] PCP/Provider -   [x] Specialty/Provider - GYN Yamileth Romero PA-C     Medication: Vestura 3-0.02 MG per tablet    Dose/Frequency: TAKE 1 TABLET BY MOUTH EVERY DAY     Quantity: 28 tablet     Pharmacy: [facility]/pharmacy #[id number] [address] [phone]    Does the patient have enough for 3 days?   [] Yes   [x] No - Send as HP to POD

## 2024-10-14 ENCOUNTER — ANNUAL EXAM (OUTPATIENT)
Dept: GYNECOLOGY | Facility: CLINIC | Age: 34
End: 2024-10-14
Payer: COMMERCIAL

## 2024-10-14 VITALS
BODY MASS INDEX: 34.58 KG/M2 | HEIGHT: 66 IN | SYSTOLIC BLOOD PRESSURE: 114 MMHG | DIASTOLIC BLOOD PRESSURE: 78 MMHG | WEIGHT: 215.2 LBS

## 2024-10-14 DIAGNOSIS — Z30.011 INITIATION OF OCP (BCP): ICD-10-CM

## 2024-10-14 DIAGNOSIS — Z01.419 ENCOUNTER FOR WELL WOMAN EXAM: Primary | ICD-10-CM

## 2024-10-14 DIAGNOSIS — Z01.419 ENCOUNTER FOR ROUTINE GYNECOLOGICAL EXAMINATION WITH PAPANICOLAOU SMEAR OF CERVIX: ICD-10-CM

## 2024-10-14 DIAGNOSIS — N92.0 MENORRHAGIA WITH REGULAR CYCLE: ICD-10-CM

## 2024-10-14 DIAGNOSIS — Z12.4 CERVICAL CANCER SCREENING: ICD-10-CM

## 2024-10-14 DIAGNOSIS — Z12.39 ENCOUNTER FOR SCREENING BREAST EXAMINATION: ICD-10-CM

## 2024-10-14 PROCEDURE — S0612 ANNUAL GYNECOLOGICAL EXAMINA: HCPCS | Performed by: PHYSICIAN ASSISTANT

## 2024-10-14 PROCEDURE — G0145 SCR C/V CYTO,THINLAYER,RESCR: HCPCS | Performed by: PHYSICIAN ASSISTANT

## 2024-10-14 PROCEDURE — G0476 HPV COMBO ASSAY CA SCREEN: HCPCS | Performed by: PHYSICIAN ASSISTANT

## 2024-10-14 RX ORDER — DROSPIRENONE AND ETHINYL ESTRADIOL 0.02-3(28)
1 KIT ORAL DAILY
Qty: 90 TABLET | Refills: 5 | Status: SHIPPED | OUTPATIENT
Start: 2024-10-14

## 2024-10-14 NOTE — PROGRESS NOTES
Assessment/Plan:      Diagnoses and all orders for this visit:    Encounter for well woman exam    Encounter for screening breast examination    Cervical cancer screening  -     Liquid-based pap, screening    Initiation of OCP (BCP)  -     drospirenone-ethinyl estradiol (Vestura) 3-0.02 MG per tablet; Take 1 tablet by mouth daily Takes continuous pills/active pills only    Menorrhagia with regular cycle  -     drospirenone-ethinyl estradiol (Vestura) 3-0.02 MG per tablet; Take 1 tablet by mouth daily Takes continuous pills/active pills only    Encounter for routine gynecological examination with Papanicolaou smear of cervix  -     Liquid-based pap, screening          Subjective:     Patient ID: Terrie Amaya is a 34 y.o. female.    Pt presents for her annual exam today--  She has no complaints except an increase in discharge  No itching or burning  No odor  Feeling well  She has no bleeding or pelvic pain--chooses to take OCP continuously, active pills only  Bowel and bladder are regular  Colonoscopy--  No breast concerns today  Last mammo--    No pap today.    Rx Vestura  Daily mvi  Nsaids prn        Review of Systems   Constitutional:  Negative for chills, fever and unexpected weight change.   HENT:  Negative for ear pain and sore throat.    Eyes:  Negative for pain and visual disturbance.   Respiratory:  Negative for cough and shortness of breath.    Cardiovascular:  Negative for chest pain and palpitations.   Gastrointestinal:  Negative for abdominal pain, blood in stool, constipation, diarrhea and vomiting.   Genitourinary: Negative.  Negative for dysuria and hematuria.   Musculoskeletal:  Negative for arthralgias and back pain.   Skin:  Negative for color change and rash.   Neurological:  Negative for seizures and syncope.   All other systems reviewed and are negative.        Objective:     Physical Exam  Vitals and nursing note reviewed.   Constitutional:       Appearance: Normal appearance. She is  well-developed.   HENT:      Head: Normocephalic and atraumatic.   Chest:   Breasts:     Right: No inverted nipple, mass, nipple discharge or skin change.      Left: No inverted nipple, mass, nipple discharge or skin change.   Abdominal:      Palpations: Abdomen is soft.   Genitourinary:     General: Normal vulva.      Exam position: Supine.      Labia:         Right: No rash, tenderness or lesion.         Left: No rash, tenderness or lesion.       Vagina: Normal.      Cervix: No cervical motion tenderness, discharge or friability.      Uterus: Normal.       Adnexa: Right adnexa normal and left adnexa normal.        Right: No mass, tenderness or fullness.          Left: No mass, tenderness or fullness.     Musculoskeletal:      Cervical back: Normal range of motion.   Lymphadenopathy:      Lower Body: No right inguinal adenopathy. No left inguinal adenopathy.   Neurological:      Mental Status: She is alert.

## 2024-10-22 LAB
LAB AP GYN PRIMARY INTERPRETATION: NORMAL
Lab: NORMAL

## 2025-04-04 PROCEDURE — 87205 SMEAR GRAM STAIN: CPT | Performed by: PHYSICIAN ASSISTANT

## 2025-04-04 PROCEDURE — 87070 CULTURE OTHR SPECIMN AEROBIC: CPT | Performed by: PHYSICIAN ASSISTANT

## 2025-04-04 PROCEDURE — 87147 CULTURE TYPE IMMUNOLOGIC: CPT | Performed by: PHYSICIAN ASSISTANT

## 2025-04-04 PROCEDURE — 87186 SC STD MICRODIL/AGAR DIL: CPT | Performed by: PHYSICIAN ASSISTANT

## 2025-05-16 ENCOUNTER — OFFICE VISIT (OUTPATIENT)
Dept: URGENT CARE | Age: 35
End: 2025-05-16
Payer: COMMERCIAL

## 2025-05-16 VITALS
TEMPERATURE: 96.7 F | HEART RATE: 86 BPM | OXYGEN SATURATION: 98 % | DIASTOLIC BLOOD PRESSURE: 79 MMHG | SYSTOLIC BLOOD PRESSURE: 135 MMHG | RESPIRATION RATE: 20 BRPM

## 2025-05-16 DIAGNOSIS — J01.41 ACUTE RECURRENT PANSINUSITIS: Primary | ICD-10-CM

## 2025-05-16 PROCEDURE — 99203 OFFICE O/P NEW LOW 30 MIN: CPT

## 2025-05-16 RX ORDER — PREDNISONE 20 MG/1
TABLET ORAL
Qty: 18 TABLET | Refills: 0 | Status: SHIPPED | OUTPATIENT
Start: 2025-05-16

## 2025-05-16 NOTE — PATIENT INSTRUCTIONS
Take antibiotic as prescribed. Recommend probiotic use while taking antibiotic.   Take steroids as directed. Recommend to take them in the morning and with food.   Continue daily allergy medication.  Acetaminophen for pain.   Follow-up with PCP in 3-5 days.   Go to ER if symptoms worsen.

## 2025-05-16 NOTE — PROGRESS NOTES
[facility]        NAME: Terrie Amaya is a 34 y.o. female  : 1990    MRN: [id number]  DATE: May 16, 2025  TIME: 1:32 PM      Assessment and Plan     Acute recurrent pansinusitis [J01.41]  1. Acute recurrent pansinusitis  amoxicillin-clavulanate (AUGMENTIN) 875-125 mg per tablet    predniSONE 20 mg tablet        Per chart review- patient has been on augmentin in the past for sinus infections.       Patient Instructions     Take antibiotic as prescribed. Recommend probiotic use while taking antibiotic.   Take steroids as directed. Recommend to take them in the morning and with food.   Continue daily allergy medication.  Acetaminophen for pain.   Follow-up with PCP in 3-5 days.   Go to ER if symptoms worsen.     Chief Complaint     Chief Complaint   Patient presents with    Sinusitis     Onset States this has been going on for 3 weeks, jaw pressure, sinus pressure, productive cough          History of Present Illness     Patient is a 34-year-old female who presents with worsening sinus pain and pressure over the past 3 weeks.  Denies fever. Denies chance of pregnancy. Reports history of sinus infections. Does have steroids to use as needed if she gets a reaction to self-administered allergy shots at home but has not had to use them in years. Was on cefdinir last month for sore in nose. Denies diabetic history.     Sinusitis  Associated symptoms include congestion and sinus pressure. Pertinent negatives include no chills.       Review of Systems     Review of Systems   Constitutional:  Negative for chills and fever.   HENT:  Positive for congestion, sinus pressure and sinus pain.    All other systems reviewed and are negative.        Current Medications     Current Medications[1]    Current Allergies     Allergies as of 2025 - Reviewed 2025   Allergen Reaction Noted    Biaxin [clarithromycin] Vomiting 2017    Ciprofloxacin Hives 2017    Sulfa antibiotics Hives 2017     Zithromax [azithromycin] Vomiting 12/27/2017              The following portions of the patient's history were reviewed and updated as appropriate: allergies, current medications, past family history, past medical history, past social history, past surgical history and problem list.     Past Medical History:   Diagnosis Date    Allergic rhinitis     perennial on IT    Anxiety     Asthma     childhood    At risk for complication of anesthesia     h/o post anesthesia bronchospasm    Contact dermatitis     Last Assessed [id number]. Resolved [id number].    COVID-19 09/2021    Epidermal inclusion cyst     Last Assessed [id number]. Resolved [id number].    Hearing loss     Migraine     PCOS (polycystic ovarian syndrome)     Skin disorder     TMJ (temporomandibular joint disorder)     needs wedge       Past Surgical History:   Procedure Laterality Date    ADENOIDECTOMY      CHOLECYSTECTOMY  05/25/2019    AL LAPAROSCOPY SURG CHOLECYSTECTOMY N/A 5/24/2019    Procedure: CHOLECYSTECTOMY LAPAROSCOPIC;  Surgeon: Jayme De Leon MD;  Location: [facility];  Service: General    AL TONSILLECTOMY PRIMARY/SECONDARY AGE 11/> N/A 1/25/2022    Procedure: TONSILLECTOMY;  Surgeon: Rolan Soto DO;  Location: [facility];  Service: ENT    TOOTH EXTRACTION  2008    WISDOM TOOTH EXTRACTION         Family History   Problem Relation Age of Onset    Depression Mother     Colon polyps Mother     Hypertension Mother     Psoriasis Mother     Diabetes Father     Hiatal hernia Father     Hypertension Father     Addiction problem Father     Alcohol abuse Father     Colon cancer Other          Medications have been verified.        Objective     /79   Pulse 86   Temp (!) 96.7 °F (35.9 °C)   Resp 20   SpO2 98%   No LMP recorded.         Physical Exam     Physical Exam  Vitals and nursing note reviewed.   Constitutional:       General: She is awake. She is not in acute distress.     Appearance: Normal appearance. She is not ill-appearing,  "toxic-appearing or diaphoretic.   HENT:      Right Ear: Tympanic membrane, ear canal and external ear normal.      Left Ear: Tympanic membrane, ear canal and external ear normal.      Nose: Congestion present.      Right Sinus: Maxillary sinus tenderness and frontal sinus tenderness present.      Left Sinus: Maxillary sinus tenderness and frontal sinus tenderness present.      Mouth/Throat:      Lips: Pink.      Mouth: Mucous membranes are moist.      Pharynx: Oropharynx is clear. Uvula midline.     Cardiovascular:      Rate and Rhythm: Normal rate.      Pulses: Normal pulses.      Heart sounds: Normal heart sounds, S1 normal and S2 normal.   Pulmonary:      Effort: Pulmonary effort is normal.      Breath sounds: Normal breath sounds and air entry.     Skin:     General: Skin is warm.      Capillary Refill: Capillary refill takes less than 2 seconds.     Neurological:      Mental Status: She is alert.     Psychiatric:         Mood and Affect: Mood normal.         Behavior: Behavior normal.         Thought Content: Thought content normal.         Judgment: Judgment normal.            [1]   Current Outpatient Medications:     amoxicillin-clavulanate (AUGMENTIN) 875-125 mg per tablet, Take 1 tablet by mouth every 12 (twelve) hours for 7 days, Disp: 14 tablet, Rfl: 0    predniSONE 20 mg tablet, 3 tabs day 1-3, 2 tabs day 4-6, 1 tab day 7-9, Disp: 18 tablet, Rfl: 0    Allergy Syringe 27G X 3/8\" 1 ML MISC, Inject into the skin once a week Use as directed once monthly, Disp: 25 each, Rfl: 1    ALPRAZolam (XANAX) 0.25 mg tablet, Take 0.25 mg by mouth 3 (three) times a day as needed  , Disp: , Rfl:     Ascorbic Acid (VITAMIN C PO), Take 3 tablets by mouth daily, Disp: , Rfl:     b complex vitamins capsule, Take 1 capsule by mouth daily, Disp: , Rfl:     Cholecalciferol (Vitamin D-3) 125 MCG (5000 UT) TABS, Take 5,000 Units by mouth daily  , Disp: , Rfl:     drospirenone-ethinyl estradiol (Vestura) 3-0.02 MG per tablet, Take " "1 tablet by mouth daily Takes continuous pills/active pills only, Disp: 90 tablet, Rfl: 5    Dupixent 300 MG/2ML SOPN, , Disp: , Rfl:     EPINEPHrine (EPIPEN) 0.3 mg/0.3 mL SOAJ, Inject 0.3 mL (0.3 mg total) into a muscle once for 1 dose, Disp: 0.6 mL, Rfl: 0    FLUoxetine (PROzac) 40 MG capsule, Take 60 mg by mouth daily (Patient not taking: Reported on 2/20/2024), Disp: , Rfl:     FLUoxetine (PROzac) 60 MG TABS, Take 60 mg by mouth daily, Disp: , Rfl:     Insulin Syringe-Needle U-100 27G X 1/2\" 1 ML MISC, Use if needed (use every 2 weeks for allergy immunotherapy), Disp: 25 each, Rfl: 0    magnesium 30 MG tablet, Take 30 mg by mouth 2 (two) times a day, Disp: , Rfl:     Monoject TB Syringe 28G X 1/2\" 0.5 ML MISC, USE IF NEEDED (EVERY 2 WEEKS FOR ALLERGY IMMUNOTHERAPY) EVERY 2 WEEKS FOR ALLERGY IMMUNOTHERPAY, Disp: 100 each, Rfl: 0    mupirocin (BACTROBAN) 2 % ointment, Apply topically 3 (three) times a day, Disp: 22 g, Rfl: 0    predniSONE 20 mg tablet, 3 tabs day 1-3, 2 tabs day 4-6, 1 tab day 7-9, Disp: 18 tablet, Rfl: 0    "

## 2025-05-19 ENCOUNTER — OFFICE VISIT (OUTPATIENT)
Age: 35
End: 2025-05-19
Payer: COMMERCIAL

## 2025-05-19 VITALS
TEMPERATURE: 97.7 F | DIASTOLIC BLOOD PRESSURE: 78 MMHG | WEIGHT: 208.8 LBS | SYSTOLIC BLOOD PRESSURE: 110 MMHG | BODY MASS INDEX: 33.56 KG/M2 | OXYGEN SATURATION: 98 % | HEART RATE: 79 BPM | HEIGHT: 66 IN

## 2025-05-19 DIAGNOSIS — E78.5 HYPERLIPIDEMIA, UNSPECIFIED HYPERLIPIDEMIA TYPE: ICD-10-CM

## 2025-05-19 DIAGNOSIS — Z13.228 SCREENING FOR METABOLIC DISORDER: Primary | ICD-10-CM

## 2025-05-19 DIAGNOSIS — R73.01 ELEVATED FASTING GLUCOSE: ICD-10-CM

## 2025-05-19 DIAGNOSIS — R79.89 ABNORMAL TSH: ICD-10-CM

## 2025-05-19 DIAGNOSIS — R53.83 OTHER FATIGUE: ICD-10-CM

## 2025-05-19 DIAGNOSIS — Z76.89 ENCOUNTER TO ESTABLISH CARE: ICD-10-CM

## 2025-05-19 DIAGNOSIS — E55.9 VITAMIN D DEFICIENCY: ICD-10-CM

## 2025-05-19 DIAGNOSIS — L73.2 HIDRADENITIS SUPPURATIVA: ICD-10-CM

## 2025-05-19 DIAGNOSIS — R71.8 LOW MEAN CORPUSCULAR VOLUME (MCV): ICD-10-CM

## 2025-05-19 DIAGNOSIS — E03.9 HYPOTHYROIDISM, UNSPECIFIED TYPE: ICD-10-CM

## 2025-05-19 DIAGNOSIS — F41.9 ANXIETY: ICD-10-CM

## 2025-05-19 PROBLEM — F42.2 MIXED OBSESSIONAL THOUGHTS AND ACTS: Status: ACTIVE | Noted: 2025-05-19

## 2025-05-19 PROBLEM — F42.2 MIXED OBSESSIONAL THOUGHTS AND ACTS: Status: RESOLVED | Noted: 2025-05-19 | Resolved: 2025-05-19

## 2025-05-19 PROBLEM — E66.812 CLASS 2 OBESITY DUE TO EXCESS CALORIES WITHOUT SERIOUS COMORBIDITY IN ADULT: Status: RESOLVED | Noted: 2019-02-13 | Resolved: 2025-05-19

## 2025-05-19 PROBLEM — G47.9 SLEEP DIFFICULTIES: Status: RESOLVED | Noted: 2019-11-25 | Resolved: 2025-05-19

## 2025-05-19 PROBLEM — Z86.59 HISTORY OF BIPOLAR DISORDER: Status: RESOLVED | Noted: 2019-11-25 | Resolved: 2025-05-19

## 2025-05-19 PROBLEM — E66.09 CLASS 2 OBESITY DUE TO EXCESS CALORIES WITHOUT SERIOUS COMORBIDITY IN ADULT: Status: RESOLVED | Noted: 2019-02-13 | Resolved: 2025-05-19

## 2025-05-19 PROCEDURE — 99203 OFFICE O/P NEW LOW 30 MIN: CPT | Performed by: NURSE PRACTITIONER

## 2025-05-19 RX ORDER — FLUOXETINE HYDROCHLORIDE 60 MG/1
60 TABLET, FILM COATED ORAL; ORAL DAILY
Qty: 90 TABLET | Refills: 0 | Status: SHIPPED | OUTPATIENT
Start: 2025-05-19

## 2025-05-19 NOTE — ASSESSMENT & PLAN NOTE
- Anxiety/OCD  -Well-controlled on Prozac 60 mg tablet daily  -Xanax as needed  -Controlled substance agreement signed while in office today    Orders:    FLUoxetine 60 MG TABS; Take 1 tablet (60 mg total) by mouth in the morning.

## 2025-05-19 NOTE — PROGRESS NOTES
Name: Terrie Amaya      : 1990      MRN: [id number]  Encounter Provider: JOSIE Mitchell  Encounter Date: 2025   Encounter department: [facility] [place]  :  Assessment & Plan  Screening for metabolic disorder    Orders:    Comprehensive metabolic panel    CBC and differential    Lipid panel    Hyperlipidemia, unspecified hyperlipidemia type    Orders:    Lipid panel    Abnormal TSH    Orders:    TSH, 3rd generation with Free T4 reflex    Hypothyroidism, unspecified type    Orders:    TSH, 3rd generation with Free T4 reflex    Elevated fasting glucose    Orders:    Hemoglobin A1C    Low mean corpuscular volume (MCV)    Orders:    TIBC Panel (incl. Iron, TIBC, % Iron Saturation)    Anxiety  - Anxiety/OCD  -Well-controlled on Prozac 60 mg tablet daily  -Xanax as needed  -Controlled substance agreement signed while in office today    Orders:    FLUoxetine 60 MG TABS; Take 1 tablet (60 mg total) by mouth in the morning.    Hidradenitis suppurativa  - No recent events         Vitamin D deficiency  - Continue with vitamin D supplementation         Other fatigue  - Will get fasting blood work         BMI 34.0-34.9,adult  -encouraged continued lifestyle modification         Encounter to establish care  - Due for fasting blood work  -Encouraged lifestyle modification  -Defers any additional vaccinations  -Up-to-date with cervical cancer screening  -Encouraged monthly self breast examination  -Follow-up in 1 month for annual physical or sooner if needed               Depression Screening and Follow-up Plan: Patient was screened for depression during today's encounter. They screened negative with a PHQ-2 score of 0.        History of Present Illness   Patient presents today to establish care.   She is a  for [employer]     Currently being treated for sinus infection, feels better, taking prednisone and Augmentin     OCD, and anxiety- Feels well controlled on  "Prozac, no side effects, denies panic attacks, PRN xanax     BMI 34.08- has lost some weight, with dietary changes    Hidradenitis suppurativa- no recent flare ups       Vitamin D deficiency- take vitamin d supplement     She reports that she get brusies easily   She does also report fatigue       Tobacco use-denies, has history, does use zyn pouches   Alcohol use-socially  Illegal drug use-denies       Review of Systems   Constitutional:  Positive for fatigue. Negative for activity change, appetite change, chills, diaphoresis and fever.   HENT:  Negative for congestion, ear discharge, ear pain, postnasal drip, rhinorrhea, sinus pressure, sinus pain and sore throat.    Eyes:  Negative for pain, discharge, itching and visual disturbance.   Respiratory:  Negative for cough, chest tightness, shortness of breath and wheezing.    Cardiovascular:  Negative for chest pain, palpitations and leg swelling.   Gastrointestinal:  Negative for abdominal pain, constipation, diarrhea, nausea and vomiting.   Endocrine: Negative for polydipsia, polyphagia and polyuria.   Genitourinary:  Negative for difficulty urinating, dysuria and urgency.   Musculoskeletal:  Negative for arthralgias, back pain and neck pain.   Skin:  Negative for rash and wound.   Neurological:  Negative for dizziness, weakness, numbness and headaches.   Psychiatric/Behavioral:  Negative for dysphoric mood. The patient is not nervous/anxious.        Objective   /78 (BP Location: Left arm, Patient Position: Sitting, Cuff Size: Standard)   Pulse 79   Temp 97.7 °F (36.5 °C) (Temporal)   Ht 5' 5.63\" (1.667 m) Comment: no shoes  Wt 94.7 kg (208 lb 12.8 oz)   SpO2 98%   BMI 34.08 kg/m²      Physical Exam  Constitutional:       General: She is not in acute distress.     Appearance: She is well-developed. She is not diaphoretic.   HENT:      Head: Normocephalic and atraumatic.      Right Ear: External ear normal.      Left Ear: External ear normal.      Nose: " Nose normal.      Mouth/Throat:      Mouth: Mucous membranes are moist.      Pharynx: No oropharyngeal exudate or posterior oropharyngeal erythema.     Eyes:      General:         Right eye: No discharge.         Left eye: No discharge.      Conjunctiva/sclera: Conjunctivae normal.      Pupils: Pupils are equal, round, and reactive to light.     Neck:      Thyroid: No thyromegaly.     Cardiovascular:      Rate and Rhythm: Normal rate and regular rhythm.      Heart sounds: Normal heart sounds. No murmur heard.     No friction rub. No gallop.   Pulmonary:      Effort: Pulmonary effort is normal. No respiratory distress.      Breath sounds: Normal breath sounds. No stridor. No wheezing or rales.   Abdominal:      General: Bowel sounds are normal. There is no distension.      Palpations: Abdomen is soft.      Tenderness: There is no abdominal tenderness.     Musculoskeletal:      Cervical back: Normal range of motion and neck supple.   Lymphadenopathy:      Cervical: No cervical adenopathy.     Skin:     General: Skin is warm and dry.      Findings: No erythema or rash.     Neurological:      Mental Status: She is alert and oriented to person, place, and time.     Psychiatric:         Behavior: Behavior normal.         Thought Content: Thought content normal.         Judgment: Judgment normal.

## 2025-05-19 NOTE — ASSESSMENT & PLAN NOTE
- Due for fasting blood work  -Encouraged lifestyle modification  -Defers any additional vaccinations  -Up-to-date with cervical cancer screening  -Encouraged monthly self breast examination  -Follow-up in 1 month for annual physical or sooner if needed

## 2025-05-22 ENCOUNTER — APPOINTMENT (OUTPATIENT)
Dept: LAB | Facility: IMAGING CENTER | Age: 35
End: 2025-05-22
Payer: COMMERCIAL

## 2025-05-22 ENCOUNTER — RESULTS FOLLOW-UP (OUTPATIENT)
Age: 35
End: 2025-05-22

## 2025-05-22 LAB
ALBUMIN SERPL BCG-MCNC: 3.7 G/DL (ref 3.5–5)
ALP SERPL-CCNC: 46 U/L (ref 34–104)
ALT SERPL W P-5'-P-CCNC: 50 U/L (ref 7–52)
ANION GAP SERPL CALCULATED.3IONS-SCNC: 10 MMOL/L (ref 4–13)
AST SERPL W P-5'-P-CCNC: 22 U/L (ref 13–39)
BASOPHILS # BLD AUTO: 0.04 THOUSANDS/ÂΜL (ref 0–0.1)
BASOPHILS NFR BLD AUTO: 0 % (ref 0–1)
BILIRUB SERPL-MCNC: 0.26 MG/DL (ref 0.2–1)
BUN SERPL-MCNC: 19 MG/DL (ref 5–25)
CALCIUM SERPL-MCNC: 9 MG/DL (ref 8.4–10.2)
CHLORIDE SERPL-SCNC: 105 MMOL/L (ref 96–108)
CHOLEST SERPL-MCNC: 172 MG/DL (ref ?–200)
CO2 SERPL-SCNC: 27 MMOL/L (ref 21–32)
CREAT SERPL-MCNC: 0.8 MG/DL (ref 0.6–1.3)
EOSINOPHIL # BLD AUTO: 0.11 THOUSAND/ÂΜL (ref 0–0.61)
EOSINOPHIL NFR BLD AUTO: 1 % (ref 0–6)
ERYTHROCYTE [DISTWIDTH] IN BLOOD BY AUTOMATED COUNT: 12.4 % (ref 11.6–15.1)
EST. AVERAGE GLUCOSE BLD GHB EST-MCNC: 105 MG/DL
GFR SERPL CREATININE-BSD FRML MDRD: 95 ML/MIN/1.73SQ M
GLUCOSE P FAST SERPL-MCNC: 80 MG/DL (ref 65–99)
HBA1C MFR BLD: 5.3 %
HCT VFR BLD AUTO: 36.6 % (ref 34.8–46.1)
HDLC SERPL-MCNC: 64 MG/DL
HGB BLD-MCNC: 11.8 G/DL (ref 11.5–15.4)
IMM GRANULOCYTES # BLD AUTO: 0.08 THOUSAND/UL (ref 0–0.2)
IMM GRANULOCYTES NFR BLD AUTO: 1 % (ref 0–2)
IRON SATN MFR SERPL: 12 % (ref 15–50)
IRON SERPL-MCNC: 44 UG/DL (ref 50–212)
LDLC SERPL CALC-MCNC: 90 MG/DL (ref 0–100)
LYMPHOCYTES # BLD AUTO: 4.59 THOUSANDS/ÂΜL (ref 0.6–4.47)
LYMPHOCYTES NFR BLD AUTO: 41 % (ref 14–44)
MCH RBC QN AUTO: 29.1 PG (ref 26.8–34.3)
MCHC RBC AUTO-ENTMCNC: 32.2 G/DL (ref 31.4–37.4)
MCV RBC AUTO: 90 FL (ref 82–98)
MONOCYTES # BLD AUTO: 0.6 THOUSAND/ÂΜL (ref 0.17–1.22)
MONOCYTES NFR BLD AUTO: 5 % (ref 4–12)
NEUTROPHILS # BLD AUTO: 5.85 THOUSANDS/ÂΜL (ref 1.85–7.62)
NEUTS SEG NFR BLD AUTO: 52 % (ref 43–75)
NONHDLC SERPL-MCNC: 108 MG/DL
NRBC BLD AUTO-RTO: 0 /100 WBCS
PLATELET # BLD AUTO: 249 THOUSANDS/UL (ref 149–390)
PMV BLD AUTO: 10.1 FL (ref 8.9–12.7)
POTASSIUM SERPL-SCNC: 4.2 MMOL/L (ref 3.5–5.3)
PROT SERPL-MCNC: 6.8 G/DL (ref 6.4–8.4)
RBC # BLD AUTO: 4.06 MILLION/UL (ref 3.81–5.12)
SODIUM SERPL-SCNC: 142 MMOL/L (ref 135–147)
TIBC SERPL-MCNC: 379.4 UG/DL (ref 250–450)
TRANSFERRIN SERPL-MCNC: 271 MG/DL (ref 203–362)
TRIGL SERPL-MCNC: 89 MG/DL (ref ?–150)
TSH SERPL DL<=0.05 MIU/L-ACNC: 3.05 UIU/ML (ref 0.45–4.5)
UIBC SERPL-MCNC: 335 UG/DL (ref 155–355)
WBC # BLD AUTO: 11.27 THOUSAND/UL (ref 4.31–10.16)

## 2025-05-22 PROCEDURE — 36415 COLL VENOUS BLD VENIPUNCTURE: CPT | Performed by: NURSE PRACTITIONER

## 2025-05-22 PROCEDURE — 80061 LIPID PANEL: CPT | Performed by: NURSE PRACTITIONER

## 2025-05-22 PROCEDURE — 83550 IRON BINDING TEST: CPT | Performed by: NURSE PRACTITIONER

## 2025-05-22 PROCEDURE — 83540 ASSAY OF IRON: CPT | Performed by: NURSE PRACTITIONER

## 2025-05-22 PROCEDURE — 84443 ASSAY THYROID STIM HORMONE: CPT | Performed by: NURSE PRACTITIONER

## 2025-05-22 PROCEDURE — 80053 COMPREHEN METABOLIC PANEL: CPT | Performed by: NURSE PRACTITIONER

## 2025-05-22 PROCEDURE — 85025 COMPLETE CBC W/AUTO DIFF WBC: CPT | Performed by: NURSE PRACTITIONER

## 2025-05-22 PROCEDURE — 83036 HEMOGLOBIN GLYCOSYLATED A1C: CPT | Performed by: NURSE PRACTITIONER

## 2025-06-30 ENCOUNTER — OFFICE VISIT (OUTPATIENT)
Age: 35
End: 2025-06-30
Payer: COMMERCIAL

## 2025-06-30 VITALS
SYSTOLIC BLOOD PRESSURE: 110 MMHG | WEIGHT: 208.6 LBS | DIASTOLIC BLOOD PRESSURE: 76 MMHG | TEMPERATURE: 97.5 F | HEART RATE: 85 BPM | HEIGHT: 66 IN | BODY MASS INDEX: 33.52 KG/M2 | OXYGEN SATURATION: 99 %

## 2025-06-30 DIAGNOSIS — Z00.00 ANNUAL PHYSICAL EXAM: Primary | ICD-10-CM

## 2025-06-30 DIAGNOSIS — F41.9 ANXIETY: ICD-10-CM

## 2025-06-30 DIAGNOSIS — E55.9 VITAMIN D DEFICIENCY: ICD-10-CM

## 2025-06-30 DIAGNOSIS — E61.1 IRON DEFICIENCY: ICD-10-CM

## 2025-06-30 PROCEDURE — 99213 OFFICE O/P EST LOW 20 MIN: CPT | Performed by: NURSE PRACTITIONER

## 2025-06-30 PROCEDURE — 99395 PREV VISIT EST AGE 18-39: CPT | Performed by: NURSE PRACTITIONER

## 2025-06-30 RX ORDER — QUINIDINE GLUCONATE 324 MG
240 TABLET, EXTENDED RELEASE ORAL
Qty: 90 TABLET | Refills: 1 | Status: SHIPPED | OUTPATIENT
Start: 2025-06-30

## 2025-06-30 NOTE — PATIENT INSTRUCTIONS
"Patient Education     Routine physical for adults   The Basics   Written by the doctors and editors at [facility]   What is a physical? -- A physical is a routine visit, or \"check-up,\" with your doctor. You might also hear it called a \"wellness visit\" or \"preventive visit.\"  During each visit, the doctor will:   Ask about your physical and mental health   Ask about your habits, behaviors, and lifestyle   Do an exam   Give you vaccines if needed   Talk to you about any medicines you take   Give advice about your health   Answer your questions  Getting regular check-ups is an important part of taking care of your health. It can help your doctor find and treat any problems you have. But it's also important for preventing health problems.  A routine physical is different from a \"sick visit.\" A sick visit is when you see a doctor because of a health concern or problem. Since physicals are scheduled ahead of time, you can think about what you want to ask the doctor.  How often should I get a physical? -- It depends on your age and health. In general, for people age 21 years and older:   If you are younger than 50 years, you might be able to get a physical every 3 years.   If you are 50 years or older, your doctor might recommend a physical every year.  If you have an ongoing health condition, like diabetes or high blood pressure, your doctor will probably want to see you more often.  What happens during a physical? -- In general, each visit will include:   Physical exam - The doctor or nurse will check your height, weight, heart rate, and blood pressure. They will also look at your eyes and ears. They will ask about how you are feeling and whether you have any symptoms that bother you.   Medicines - It's a good idea to bring a list of all the medicines you take to each doctor visit. Your doctor will talk to you about your medicines and answer any questions. Tell them if you are having any side effects that bother you. You " "should also tell them if you are having trouble paying for any of your medicines.   Habits and behaviors - This includes:   Your diet   Your exercise habits   Whether you smoke, drink alcohol, or use drugs   Whether you are sexually active   Whether you feel safe at home  Your doctor will talk to you about things you can do to improve your health and lower your risk of health problems. They will also offer help and support. For example, if you want to quit smoking, they can give you advice and might prescribe medicines. If you want to improve your diet or get more physical activity, they can help you with this, too.   Lab tests, if needed - The tests you get will depend on your age and situation. For example, your doctor might want to check your:   Cholesterol   Blood sugar   Iron level   Vaccines - The recommended vaccines will depend on your age, health, and what vaccines you already had. Vaccines are very important because they can prevent certain serious or deadly infections.   Discussion of screening - \"Screening\" means checking for diseases or other health problems before they cause symptoms. Your doctor can recommend screening based on your age, risk, and preferences. This might include tests to check for:   Cancer, such as breast, prostate, cervical, ovarian, colorectal, prostate, lung, or skin cancer   Sexually transmitted infections, such as chlamydia and gonorrhea   Mental health conditions like depression and anxiety  Your doctor will talk to you about the different types of screening tests. They can help you decide which screenings to have. They can also explain what the results might mean.   Answering questions - The physical is a good time to ask the doctor or nurse questions about your health. If needed, they can refer you to other doctors or specialists, too.  Adults older than 65 years often need other care, too. As you get older, your doctor will talk to you about:   How to prevent falling at " home   Hearing or vision tests   Memory testing   How to take your medicines safely   Making sure that you have the help and support you need at home  All topics are updated as new evidence becomes available and our peer review process is complete.  This topic retrieved from [employer] on: May 02, 2024.  Topic 884749 Version 1.0  Release: 32.4.3 - C32.122  © 2024 UpToDate, Inc. and/or its affiliates. All rights reserved.  Consumer Information Use and Disclaimer   Disclaimer: This generalized information is a limited summary of diagnosis, treatment, and/or medication information. It is not meant to be comprehensive and should be used as a tool to help the user understand and/or assess potential diagnostic and treatment options. It does NOT include all information about conditions, treatments, medications, side effects, or risks that may apply to a specific patient. It is not intended to be medical advice or a substitute for the medical advice, diagnosis, or treatment of a health care provider based on the health care provider's examination and assessment of a patient's specific and unique circumstances. Patients must speak with a health care provider for complete information about their health, medical questions, and treatment options, including any risks or benefits regarding use of medications. This information does not endorse any treatments or medications as safe, effective, or approved for treating a specific patient. UpToDate, Inc. and its affiliates disclaim any warranty or liability relating to this information or the use thereof.The use of this information is governed by the Terms of Use, available at https://www.woltersSocialMeterTVuwer.com/en/know/clinical-effectiveness-terms. 2024© UpToDate, Inc. and its affiliates and/or licensors. All rights reserved.  Copyright   © 2024 UpToDate, Inc. and/or its affiliates. All rights reserved.

## 2025-06-30 NOTE — ASSESSMENT & PLAN NOTE
-start iron, continue vitamin C  -recheck CBC    Orders:    ferrous gluconate (FERGON) 240 (27 FE) MG tablet; Take 1 tablet (240 mg total) by mouth daily with breakfast

## 2025-06-30 NOTE — ASSESSMENT & PLAN NOTE
- Anxiety/OCD  -Well-controlled on Prozac 60 mg tablet daily  -Xanax as needed  -Controlled substance agreement signed while in office today

## 2025-06-30 NOTE — PROGRESS NOTES
Adult Annual Physical  Name: Terrie Amaya      : 1990      MRN: [id number]  Encounter Provider: JOSIE Mitchell  Encounter Date: 2025   Encounter department: [facility]    :  Assessment & Plan  Iron deficiency  -start iron, continue vitamin C  -recheck CBC    Orders:    ferrous gluconate (FERGON) 240 (27 FE) MG tablet; Take 1 tablet (240 mg total) by mouth daily with breakfast    Anxiety  - Anxiety/OCD  -Well-controlled on Prozac 60 mg tablet daily  -Xanax as needed  -Controlled substance agreement signed while in office today           Vitamin D deficiency  - Continue with vitamin D supplementation           BMI 34.0-34.9,adult  -encouraged continued lifestyle modification           Annual physical exam  -up to date with fasting blood work  -Encouraged lifestyle modification  -Defers any additional vaccinations  -Up-to-date with cervical cancer screening  -Encouraged monthly self breast examination  -Follow-up in 1 year for annual physical or sooner if needed               Preventive Screenings:  - Diabetes Screening: screening up-to-date  - Cholesterol Screening: screening not indicated, has hyperlipidemia and screening up-to-date   - Hepatitis C screening: risks/benefits discussed   - HIV screening: risks/benefits discussed   - Cervical cancer screening: screening up-to-date   - Colon cancer screening: screening not indicated   - Lung cancer screening: screening not indicated     Immunizations:    - Risks/benefits immunizations discussed      Counseling/Anticipatory Guidance:  - Alcohol: discussed moderation in alcohol intake and recommendations for healthy alcohol use.   - Drug use: discussed harms of illicit drug use and how it can negatively impact mental/physical health.   - Tobacco use: discussed harms of tobacco use and management options for quitting.   - Dental health: discussed importance of regular tooth brushing, flossing, and dental  visits.   - Sexual health: discussed sexually transmitted diseases, partner selection, use of condoms, avoidance of unintended pregnancy, and contraceptive alternatives.   - Diet: discussed recommendations for a healthy/well-balanced diet.   - Exercise: the importance of regular exercise/physical activity was discussed. Recommend exercise 3-5 times per week for at least 30 minutes.   - Injury prevention: discussed safety/seat belts, safety helmets, smoke detectors, carbon monoxide detectors, and smoking near bedding or upholstery.          History of Present Illness     Adult Annual Physical:  Patient presents for annual physical. Patient presents today for follow up and annual physical.   Reviewed bloodwork while office today.  She is a  for [employer]      Currently being treated for sinus infection, feels better, taking prednisone and Augmentin      OCD, and anxiety- Feels well controlled on Prozac, no side effects, denies panic attacks, PRN xanax      BMI 34.08- has lost some weight, with dietary changes     Hidradenitis suppurativa- no recent flare ups       Vitamin D deficiency- take vitamin d supplement     Iron deficiency-iron level 44, currently takes vitamin C supplementation             Tobacco use-denies, has history, does use zyn pouches   Alcohol use-socially  Illegal drug use-denies   .     Diet and Physical Activity:  - Diet/Nutrition: low calorie diet, consuming 3-5 servings of fruits/vegetables daily and limited junk food. gluten free  - Exercise: no formal exercise.    Depression Screening:  - PHQ-2 Score: 0    General Health:  - Sleep: sleeps poorly and 4-6 hours of sleep on average.  - Hearing: normal hearing bilateral ears.  - Vision: most recent eye exam > 1 year ago. has glasses but does not need to wear them  - Dental: regular dental visits, brushes teeth once daily and floss regularly.    /GYN Health:  - Follows with GYN: yes.   - History of STDs: no  - Contraception: oral  "contraceptives.      Advanced Care Planning:  - Has an advanced directive?: no    - Has a durable medical POA?: no      Review of Systems   Constitutional:  Negative for activity change, appetite change, chills, diaphoresis and fever.   HENT:  Negative for congestion, ear discharge, ear pain, postnasal drip, rhinorrhea, sinus pressure, sinus pain and sore throat.    Eyes:  Negative for pain, discharge, itching and visual disturbance.   Respiratory:  Negative for cough, chest tightness, shortness of breath and wheezing.    Cardiovascular:  Negative for chest pain, palpitations and leg swelling.   Gastrointestinal:  Negative for abdominal pain, constipation, diarrhea, nausea and vomiting.   Endocrine: Negative for polydipsia, polyphagia and polyuria.   Genitourinary:  Negative for difficulty urinating, dysuria and urgency.   Musculoskeletal:  Negative for arthralgias, back pain and neck pain.   Skin:  Negative for rash and wound.   Neurological:  Negative for dizziness, weakness, numbness and headaches.         Objective   /76 (BP Location: Left arm, Patient Position: Sitting, Cuff Size: Standard)   Pulse 85   Temp 97.5 °F (36.4 °C) (Temporal)   Ht 5' 5.63\" (1.667 m)   Wt 94.6 kg (208 lb 9.6 oz)   SpO2 99%   BMI 34.05 kg/m²     Physical Exam  Constitutional:       General: She is not in acute distress.     Appearance: She is well-developed. She is not diaphoretic.   HENT:      Head: Normocephalic and atraumatic.      Right Ear: External ear normal.      Left Ear: External ear normal.      Nose: Nose normal.      Mouth/Throat:      Mouth: Mucous membranes are moist.      Pharynx: No oropharyngeal exudate or posterior oropharyngeal erythema.     Eyes:      General:         Right eye: No discharge.         Left eye: No discharge.      Conjunctiva/sclera: Conjunctivae normal.      Pupils: Pupils are equal, round, and reactive to light.     Neck:      Thyroid: No thyromegaly.     Cardiovascular:      Rate and " Rhythm: Normal rate and regular rhythm.      Heart sounds: Normal heart sounds. No murmur heard.     No friction rub. No gallop.   Pulmonary:      Effort: Pulmonary effort is normal. No respiratory distress.      Breath sounds: Normal breath sounds. No stridor. No wheezing or rales.   Abdominal:      General: Bowel sounds are normal. There is no distension.      Palpations: Abdomen is soft.      Tenderness: There is no abdominal tenderness.     Musculoskeletal:      Cervical back: Normal range of motion and neck supple.   Lymphadenopathy:      Cervical: No cervical adenopathy.     Skin:     General: Skin is warm and dry.      Findings: No erythema or rash.     Neurological:      Mental Status: She is alert and oriented to person, place, and time.     Psychiatric:         Behavior: Behavior normal.         Thought Content: Thought content normal.         Judgment: Judgment normal.

## 2025-06-30 NOTE — ASSESSMENT & PLAN NOTE
-up to date with fasting blood work  -Encouraged lifestyle modification  -Defers any additional vaccinations  -Up-to-date with cervical cancer screening  -Encouraged monthly self breast examination  -Follow-up in 1 year for annual physical or sooner if needed

## 2025-07-27 ENCOUNTER — OFFICE VISIT (OUTPATIENT)
Dept: URGENT CARE | Age: 35
End: 2025-07-27
Payer: COMMERCIAL

## 2025-07-27 VITALS
DIASTOLIC BLOOD PRESSURE: 65 MMHG | RESPIRATION RATE: 20 BRPM | OXYGEN SATURATION: 99 % | HEART RATE: 70 BPM | TEMPERATURE: 97.6 F | SYSTOLIC BLOOD PRESSURE: 127 MMHG

## 2025-07-27 DIAGNOSIS — L23.9 ALLERGIC CONTACT DERMATITIS, UNSPECIFIED TRIGGER: Primary | ICD-10-CM

## 2025-07-27 PROCEDURE — 99213 OFFICE O/P EST LOW 20 MIN: CPT | Performed by: STUDENT IN AN ORGANIZED HEALTH CARE EDUCATION/TRAINING PROGRAM

## 2025-07-27 RX ORDER — PREDNISONE 20 MG/1
TABLET ORAL
Qty: 18 TABLET | Refills: 0 | Status: SHIPPED | OUTPATIENT
Start: 2025-07-27

## 2025-08-17 DIAGNOSIS — F41.9 ANXIETY: ICD-10-CM

## 2025-08-19 RX ORDER — FLUOXETINE HYDROCHLORIDE 60 MG/1
60 TABLET, FILM COATED ORAL; ORAL DAILY
Qty: 90 TABLET | Refills: 1 | Status: SHIPPED | OUTPATIENT
Start: 2025-08-19

## (undated) DEVICE — SUT PDS II 1 CTX-B 36 IN ZB371

## (undated) DEVICE — MEDI-VAC YANK SUCT HNDL W/TPRD BULBOUS TIP: Brand: CARDINAL HEALTH

## (undated) DEVICE — SUT VICRYL PLUS 0 CTB-1 27 IN VCPB260H

## (undated) DEVICE — SUT VICRYL 0 UR-6 27 IN J603H

## (undated) DEVICE — INTENDED FOR TISSUE SEPARATION, AND OTHER PROCEDURES THAT REQUIRE A SHARP SURGICAL BLADE TO PUNCTURE OR CUT.: Brand: BARD-PARKER SAFETY BLADES SIZE 15, STERILE

## (undated) DEVICE — TROCAR: Brand: KII® SLEEVE

## (undated) DEVICE — MEDI-VAC YANKAUER SUCTION HANDLE W/BULBOUS AND CONTROL VENT: Brand: CARDINAL HEALTH

## (undated) DEVICE — 2000CC GUARDIAN II: Brand: GUARDIAN

## (undated) DEVICE — CHLORAPREP HI-LITE 26ML ORANGE

## (undated) DEVICE — AIRLIFE™ TRI-FLO™ SUCTION CATHETER WITH CONTROL PORT: Brand: AIRLIFE™

## (undated) DEVICE — ROSEBUD DISSECTORS: Brand: DEROYAL

## (undated) DEVICE — LIGAMAX 5 MM ENDOSCOPIC MULTIPLE CLIP APPLIER: Brand: LIGAMAX

## (undated) DEVICE — GLOVE SRG BIOGEL ORTHOPEDIC 7.5

## (undated) DEVICE — GLOVE INDICATOR PI UNDERGLOVE SZ 8 BLUE

## (undated) DEVICE — 3000CC GUARDIAN II: Brand: GUARDIAN

## (undated) DEVICE — SUT MONOCRYL 4-0 PS-2 18 IN Y496G

## (undated) DEVICE — STERILE BETHLEHEM T AND A PACK: Brand: CARDINAL HEALTH

## (undated) DEVICE — SUT VICRYL 2-0 REEL 54 IN J286G

## (undated) DEVICE — SUCTION BOVIE ENT

## (undated) DEVICE — SKIN MARKER DUAL TIP WITH RULER CAP, FLEXIBLE RULER AND LABELS: Brand: DEVON

## (undated) DEVICE — NEEDLE 25GA X 1 IN SAFETY GLIDE

## (undated) DEVICE — SCD SEQUENTIAL COMPRESSION COMFORT SLEEVE MEDIUM KNEE LENGTH: Brand: KENDALL SCD

## (undated) DEVICE — PACK PBDS LAP CHOLE RF

## (undated) DEVICE — TROCAR: Brand: KII FIOS FIRST ENTRY

## (undated) DEVICE — BETHLEHEM MAJOR GENERAL PACK: Brand: CARDINAL HEALTH

## (undated) DEVICE — PROXIMATE PLUS MD MULTI-DIRECTIONAL RELEASE SKIN STAPLERS CONTAINS 35 STAINLESS STEEL STAPLES APPROXIMATE CLOSED DIMENSIONS: 6.9MM X 3.9MM WIDE: Brand: PROXIMATE

## (undated) DEVICE — GLOVE SRG BIOGEL ORTHOPEDIC 7

## (undated) DEVICE — WAND COBLATION  PROCISE XP TONSIL

## (undated) DEVICE — GLOVE SRG BIOGEL 7

## (undated) DEVICE — INTENDED FOR TISSUE SEPARATION, AND OTHER PROCEDURES THAT REQUIRE A SHARP SURGICAL BLADE TO PUNCTURE OR CUT.: Brand: BARD-PARKER SAFETY BLADES SIZE 11, STERILE

## (undated) DEVICE — HEMOCLIP CARTRIDGE LRG

## (undated) DEVICE — SUT VICRYL 2-0 SH 27 IN UNDYED J417H

## (undated) DEVICE — TISSUE RETRIEVAL SYSTEM: Brand: INZII RETRIEVAL SYSTEM

## (undated) DEVICE — INTENDED FOR TISSUE SEPARATION, AND OTHER PROCEDURES THAT REQUIRE A SHARP SURGICAL BLADE TO PUNCTURE OR CUT.: Brand: BARD-PARKER SAFETY BLADES SIZE 10, STERILE

## (undated) DEVICE — SPONGE TONSIL STRUNG 7/8 IN X-RAY DETECT

## (undated) DEVICE — GRASPER ATRAUMATIC FEN 5MM X 33CM ROTATING THUMB LOOP GENI-SURGE

## (undated) DEVICE — ADHESIVE SKN CLSR HISTOACRYL FLEX 0.5ML LF

## (undated) DEVICE — SUT SILK 2-0 18 IN A185H

## (undated) DEVICE — VIOLET BRAIDED (POLYGLACTIN 910), SYNTHETIC ABSORBABLE SUTURE: Brand: COATED VICRYL

## (undated) DEVICE — PLUMEPEN PRO 10FT

## (undated) DEVICE — HEMOCLIP CARTRIDGE MED

## (undated) DEVICE — Device: Brand: KII® BALLOON BLUNT TIP SYSTEM

## (undated) DEVICE — ELECTRODE LAP J HOOK SPLIT STEM E-Z CLEAN 33CM -0021S

## (undated) DEVICE — ANTI-FOG SOLUTION WITH FOAM PAD: Brand: DEVON